# Patient Record
Sex: MALE | Race: WHITE | NOT HISPANIC OR LATINO
[De-identification: names, ages, dates, MRNs, and addresses within clinical notes are randomized per-mention and may not be internally consistent; named-entity substitution may affect disease eponyms.]

---

## 2017-01-13 ENCOUNTER — APPOINTMENT (OUTPATIENT)
Dept: ELECTROPHYSIOLOGY | Facility: CLINIC | Age: 62
End: 2017-01-13

## 2017-02-27 ENCOUNTER — APPOINTMENT (OUTPATIENT)
Dept: ELECTROPHYSIOLOGY | Facility: CLINIC | Age: 62
End: 2017-02-27

## 2017-02-27 VITALS — DIASTOLIC BLOOD PRESSURE: 82 MMHG | SYSTOLIC BLOOD PRESSURE: 124 MMHG | HEART RATE: 112 BPM | HEIGHT: 71 IN

## 2017-02-27 VITALS — OXYGEN SATURATION: 98 %

## 2017-02-27 DIAGNOSIS — Z78.9 OTHER SPECIFIED HEALTH STATUS: ICD-10-CM

## 2017-02-27 DIAGNOSIS — Z86.19 PERSONAL HISTORY OF OTHER INFECTIOUS AND PARASITIC DISEASES: ICD-10-CM

## 2017-02-27 DIAGNOSIS — Z83.79 FAMILY HISTORY OF OTHER DISEASES OF THE DIGESTIVE SYSTEM: ICD-10-CM

## 2017-03-02 PROBLEM — Z78.9 CURRENT NON-SMOKER: Status: ACTIVE | Noted: 2017-03-02

## 2017-03-02 RX ORDER — WARFARIN 10 MG/1
10 TABLET ORAL
Refills: 0 | Status: DISCONTINUED | COMMUNITY
End: 2017-03-02

## 2017-03-22 ENCOUNTER — TRANSCRIPTION ENCOUNTER (OUTPATIENT)
Age: 62
End: 2017-03-22

## 2017-03-22 ENCOUNTER — OUTPATIENT (OUTPATIENT)
Dept: OUTPATIENT SERVICES | Facility: HOSPITAL | Age: 62
LOS: 1 days | End: 2017-03-22
Payer: COMMERCIAL

## 2017-03-22 VITALS
WEIGHT: 184.97 LBS | DIASTOLIC BLOOD PRESSURE: 85 MMHG | RESPIRATION RATE: 18 BRPM | HEART RATE: 79 BPM | TEMPERATURE: 98 F | OXYGEN SATURATION: 95 % | SYSTOLIC BLOOD PRESSURE: 172 MMHG | HEIGHT: 71 IN

## 2017-03-22 VITALS
TEMPERATURE: 98 F | DIASTOLIC BLOOD PRESSURE: 107 MMHG | RESPIRATION RATE: 16 BRPM | HEART RATE: 76 BPM | SYSTOLIC BLOOD PRESSURE: 172 MMHG | OXYGEN SATURATION: 96 %

## 2017-03-22 DIAGNOSIS — Z98.890 OTHER SPECIFIED POSTPROCEDURAL STATES: Chronic | ICD-10-CM

## 2017-03-22 DIAGNOSIS — I48.1 PERSISTENT ATRIAL FIBRILLATION: ICD-10-CM

## 2017-03-22 LAB
ANION GAP SERPL CALC-SCNC: 11 MMOL/L — SIGNIFICANT CHANGE UP (ref 5–17)
ANISOCYTOSIS BLD QL: SLIGHT — SIGNIFICANT CHANGE UP
APTT BLD: 41.2 SEC — HIGH (ref 27.5–37.4)
BUN SERPL-MCNC: 19 MG/DL — SIGNIFICANT CHANGE UP (ref 8–20)
CALCIUM SERPL-MCNC: 8.9 MG/DL — SIGNIFICANT CHANGE UP (ref 8.6–10.2)
CHLORIDE SERPL-SCNC: 103 MMOL/L — SIGNIFICANT CHANGE UP (ref 98–107)
CO2 SERPL-SCNC: 25 MMOL/L — SIGNIFICANT CHANGE UP (ref 22–29)
CREAT SERPL-MCNC: 0.64 MG/DL — SIGNIFICANT CHANGE UP (ref 0.5–1.3)
EOSINOPHIL NFR BLD AUTO: 1 % — SIGNIFICANT CHANGE UP (ref 0–6)
GLUCOSE SERPL-MCNC: 105 MG/DL — SIGNIFICANT CHANGE UP (ref 70–115)
HCT VFR BLD CALC: 44.5 % — SIGNIFICANT CHANGE UP (ref 42–52)
HGB BLD-MCNC: 15.3 G/DL — SIGNIFICANT CHANGE UP (ref 14–18)
INR BLD: 1.63 RATIO — HIGH (ref 0.88–1.16)
LYMPHOCYTES # BLD AUTO: 30 % — SIGNIFICANT CHANGE UP (ref 20–55)
MACROCYTES BLD QL: SLIGHT — SIGNIFICANT CHANGE UP
MCHC RBC-ENTMCNC: 31.3 PG — HIGH (ref 27–31)
MCHC RBC-ENTMCNC: 34.4 G/DL — SIGNIFICANT CHANGE UP (ref 32–36)
MCV RBC AUTO: 91 FL — SIGNIFICANT CHANGE UP (ref 80–94)
MICROCYTES BLD QL: SLIGHT — SIGNIFICANT CHANGE UP
MONOCYTES NFR BLD AUTO: 7 % — SIGNIFICANT CHANGE UP (ref 3–10)
NEUTROPHILS NFR BLD AUTO: 57 % — SIGNIFICANT CHANGE UP (ref 37–73)
PLAT MORPH BLD: NORMAL — SIGNIFICANT CHANGE UP
PLATELET # BLD AUTO: 105 K/UL — LOW (ref 150–400)
POIKILOCYTOSIS BLD QL AUTO: SLIGHT — SIGNIFICANT CHANGE UP
POTASSIUM SERPL-MCNC: 5.2 MMOL/L — SIGNIFICANT CHANGE UP (ref 3.5–5.3)
POTASSIUM SERPL-SCNC: 5.2 MMOL/L — SIGNIFICANT CHANGE UP (ref 3.5–5.3)
PROTHROM AB SERPL-ACNC: 18.1 SEC — HIGH (ref 9.8–12.7)
RBC # BLD: 4.89 M/UL — SIGNIFICANT CHANGE UP (ref 4.6–6.2)
RBC # FLD: 13.7 % — SIGNIFICANT CHANGE UP (ref 11–15.6)
RBC BLD AUTO: ABNORMAL
SODIUM SERPL-SCNC: 139 MMOL/L — SIGNIFICANT CHANGE UP (ref 135–145)
VARIANT LYMPHS # BLD: 5 % — SIGNIFICANT CHANGE UP (ref 0–6)
WBC # BLD: 6.5 K/UL — SIGNIFICANT CHANGE UP (ref 4.8–10.8)
WBC # FLD AUTO: 6.5 K/UL — SIGNIFICANT CHANGE UP (ref 4.8–10.8)

## 2017-03-22 PROCEDURE — 80048 BASIC METABOLIC PNL TOTAL CA: CPT

## 2017-03-22 PROCEDURE — 93325 DOPPLER ECHO COLOR FLOW MAPG: CPT | Mod: 26

## 2017-03-22 PROCEDURE — 76376 3D RENDER W/INTRP POSTPROCES: CPT | Mod: 26

## 2017-03-22 PROCEDURE — 93312 ECHO TRANSESOPHAGEAL: CPT

## 2017-03-22 PROCEDURE — 85730 THROMBOPLASTIN TIME PARTIAL: CPT

## 2017-03-22 PROCEDURE — 85610 PROTHROMBIN TIME: CPT

## 2017-03-22 PROCEDURE — 93325 DOPPLER ECHO COLOR FLOW MAPG: CPT

## 2017-03-22 PROCEDURE — 93312 ECHO TRANSESOPHAGEAL: CPT | Mod: 26

## 2017-03-22 PROCEDURE — 93320 DOPPLER ECHO COMPLETE: CPT | Mod: 26

## 2017-03-22 PROCEDURE — 85027 COMPLETE CBC AUTOMATED: CPT

## 2017-03-22 PROCEDURE — 93010 ELECTROCARDIOGRAM REPORT: CPT

## 2017-03-22 PROCEDURE — 93005 ELECTROCARDIOGRAM TRACING: CPT

## 2017-03-22 PROCEDURE — 92960 CARDIOVERSION ELECTRIC EXT: CPT

## 2017-03-22 RX ORDER — HYDRALAZINE HCL 50 MG
10 TABLET ORAL ONCE
Qty: 0 | Refills: 0 | Status: COMPLETED | OUTPATIENT
Start: 2017-03-22 | End: 2017-03-22

## 2017-03-22 RX ADMIN — Medication 10 MILLIGRAM(S): at 11:59

## 2017-03-22 NOTE — DISCHARGE NOTE ADULT - MEDICATION SUMMARY - MEDICATIONS TO TAKE
I will START or STAY ON the medications listed below when I get home from the hospital:    amiodorone  -- 200 milligram(s) by mouth 2 times a day Then start 200mg 1 time daily  -- Indication: For Atrial fibrillation    lisinopril 20 mg oral tablet  -- 1 tab(s) by mouth once a day  -- Indication: For Hypertension    diltiazem 24 hour extended release  -- 180 milligram(s) by mouth once a day  -- Indication: For Hypertension    Xarelto 20 mg oral tablet  -- 1 tab(s) by mouth once a day (in the evening)  -- Indication: For Atrial fibrillation    Metoprolol Succinate  mg oral tablet, extended release  -- 1 tab(s) by mouth once a day  -- Indication: For Hypertension

## 2017-03-22 NOTE — DISCHARGE NOTE ADULT - HOSPITAL COURSE
Elective AWAIS/cardioversion successful.  AWAIS no clott 200 joules one attempt to NSR.  12 lead EKG NSR HR 60's.  Amiodarone load 200 mg twice a day for 2 weeks then decrease 200 mg once daily. Follow up with Dr Ruiz as outpatint in 2-3 weeks.

## 2017-03-22 NOTE — H&P PST ADULT - HISTORY OF PRESENT ILLNESS
62 yo male with h/o hypertension and paroxsymal afib for past 10 years with more then 15 prior cardioversions.  The last cardioversion 2016 lasted about 20 days.  Patient has a history of having sub-therapeutic INR on coumadin.  Patient is now on xarelto takes them at bedtime.  Patient states he has not missed a dose.  The last dose was taken last night.  Patient here today for AWIAS/Cardioversion.

## 2017-03-22 NOTE — DISCHARGE NOTE ADULT - CARE PROVIDER_API CALL
Aditay Ruiz (MD), Cardiac Electrophysiology; Cardiovascular Disease; Internal Medicine  [address] [place] [address]  Phone: [phone]  Fax: [phone]

## 2017-03-22 NOTE — H&P PST ADULT - PSH
History of cardioversion  X 15 times  S/P foot surgery, left    S/P hernia repair    S/P subdural hematoma evacuation  several surgeries

## 2017-03-22 NOTE — DISCHARGE NOTE ADULT - PATIENT PORTAL LINK FT
“You can access the FollowHealth Patient Portal, offered by [facility], by registering with the following website: [email or web address]”

## 2017-03-22 NOTE — DISCHARGE NOTE ADULT - CARE PLAN
Principal Discharge DX:	Atrial fibrillation  Goal:	ADL without SOB  Instructions for follow-up, activity and diet:	Follow up with Dr Ruiz as outpatient in 2-3 weeks

## 2017-03-28 ENCOUNTER — APPOINTMENT (OUTPATIENT)
Dept: UROLOGY | Facility: CLINIC | Age: 62
End: 2017-03-28

## 2017-03-28 VITALS
SYSTOLIC BLOOD PRESSURE: 164 MMHG | HEART RATE: 71 BPM | TEMPERATURE: 98.2 F | OXYGEN SATURATION: 96 % | WEIGHT: 185 LBS | BODY MASS INDEX: 25.9 KG/M2 | DIASTOLIC BLOOD PRESSURE: 98 MMHG | HEIGHT: 71 IN

## 2017-03-28 DIAGNOSIS — N42.89 OTHER SPECIFIED DISORDERS OF PROSTATE: ICD-10-CM

## 2017-03-28 RX ORDER — SILDENAFIL 20 MG/1
20 TABLET ORAL
Qty: 50 | Refills: 11 | Status: DISCONTINUED | COMMUNITY
Start: 2017-03-28 | End: 2017-03-28

## 2017-04-05 DIAGNOSIS — N52.9 MALE ERECTILE DYSFUNCTION, UNSPECIFIED: ICD-10-CM

## 2017-04-18 ENCOUNTER — APPOINTMENT (OUTPATIENT)
Dept: UROLOGY | Facility: CLINIC | Age: 62
End: 2017-04-18

## 2017-04-28 PROBLEM — N52.9 ERECTILE DYSFUNCTION: Status: ACTIVE | Noted: 2017-03-28

## 2017-04-28 LAB — PSA SERPL-MCNC: 0.17

## 2017-05-19 ENCOUNTER — OUTPATIENT (OUTPATIENT)
Dept: OUTPATIENT SERVICES | Facility: HOSPITAL | Age: 62
LOS: 1 days | End: 2017-05-19
Payer: COMMERCIAL

## 2017-05-19 VITALS — WEIGHT: 182.1 LBS | HEIGHT: 71 IN

## 2017-05-19 VITALS
HEIGHT: 71 IN | HEART RATE: 71 BPM | WEIGHT: 182.1 LBS | RESPIRATION RATE: 18 BRPM | SYSTOLIC BLOOD PRESSURE: 165 MMHG | TEMPERATURE: 98 F | OXYGEN SATURATION: 96 % | DIASTOLIC BLOOD PRESSURE: 99 MMHG

## 2017-05-19 DIAGNOSIS — Z98.890 OTHER SPECIFIED POSTPROCEDURAL STATES: Chronic | ICD-10-CM

## 2017-05-19 DIAGNOSIS — Z01.810 ENCOUNTER FOR PREPROCEDURAL CARDIOVASCULAR EXAMINATION: ICD-10-CM

## 2017-05-19 DIAGNOSIS — I48.1 PERSISTENT ATRIAL FIBRILLATION: ICD-10-CM

## 2017-05-19 LAB
ANION GAP SERPL CALC-SCNC: 13 MMOL/L — SIGNIFICANT CHANGE UP (ref 5–17)
APTT BLD: 41 SEC — HIGH (ref 27.5–37.4)
BASOPHILS # BLD AUTO: 0 K/UL — SIGNIFICANT CHANGE UP (ref 0–0.2)
BASOPHILS NFR BLD AUTO: 0.3 % — SIGNIFICANT CHANGE UP (ref 0–2)
BLD GP AB SCN SERPL QL: SIGNIFICANT CHANGE UP
BUN SERPL-MCNC: 15 MG/DL — SIGNIFICANT CHANGE UP (ref 8–20)
CALCIUM SERPL-MCNC: 8.8 MG/DL — SIGNIFICANT CHANGE UP (ref 8.6–10.2)
CHLORIDE SERPL-SCNC: 101 MMOL/L — SIGNIFICANT CHANGE UP (ref 98–107)
CO2 SERPL-SCNC: 25 MMOL/L — SIGNIFICANT CHANGE UP (ref 22–29)
CREAT SERPL-MCNC: 0.73 MG/DL — SIGNIFICANT CHANGE UP (ref 0.5–1.3)
EOSINOPHIL # BLD AUTO: 0.2 K/UL — SIGNIFICANT CHANGE UP (ref 0–0.5)
EOSINOPHIL NFR BLD AUTO: 3.2 % — SIGNIFICANT CHANGE UP (ref 0–6)
GLUCOSE SERPL-MCNC: 120 MG/DL — HIGH (ref 70–115)
HCT VFR BLD CALC: 43.8 % — SIGNIFICANT CHANGE UP (ref 42–52)
HGB BLD-MCNC: 14.9 G/DL — SIGNIFICANT CHANGE UP (ref 14–18)
INR BLD: 1.25 RATIO — HIGH (ref 0.88–1.16)
LYMPHOCYTES # BLD AUTO: 2.1 K/UL — SIGNIFICANT CHANGE UP (ref 1–4.8)
LYMPHOCYTES # BLD AUTO: 35.2 % — SIGNIFICANT CHANGE UP (ref 20–55)
MCHC RBC-ENTMCNC: 30.6 PG — SIGNIFICANT CHANGE UP (ref 27–31)
MCHC RBC-ENTMCNC: 34 G/DL — SIGNIFICANT CHANGE UP (ref 32–36)
MCV RBC AUTO: 89.9 FL — SIGNIFICANT CHANGE UP (ref 80–94)
MONOCYTES # BLD AUTO: 0.7 K/UL — SIGNIFICANT CHANGE UP (ref 0–0.8)
MONOCYTES NFR BLD AUTO: 11.1 % — HIGH (ref 3–10)
NEUTROPHILS # BLD AUTO: 3 K/UL — SIGNIFICANT CHANGE UP (ref 1.8–8)
NEUTROPHILS NFR BLD AUTO: 50 % — SIGNIFICANT CHANGE UP (ref 37–73)
PLATELET # BLD AUTO: 101 K/UL — LOW (ref 150–400)
POTASSIUM SERPL-MCNC: 3.6 MMOL/L — SIGNIFICANT CHANGE UP (ref 3.5–5.3)
POTASSIUM SERPL-SCNC: 3.6 MMOL/L — SIGNIFICANT CHANGE UP (ref 3.5–5.3)
PROTHROM AB SERPL-ACNC: 13.8 SEC — HIGH (ref 9.8–12.7)
RBC # BLD: 4.87 M/UL — SIGNIFICANT CHANGE UP (ref 4.6–6.2)
RBC # FLD: 13.4 % — SIGNIFICANT CHANGE UP (ref 11–15.6)
SODIUM SERPL-SCNC: 139 MMOL/L — SIGNIFICANT CHANGE UP (ref 135–145)
TYPE + AB SCN PNL BLD: SIGNIFICANT CHANGE UP
WBC # BLD: 6 K/UL — SIGNIFICANT CHANGE UP (ref 4.8–10.8)
WBC # FLD AUTO: 6 K/UL — SIGNIFICANT CHANGE UP (ref 4.8–10.8)

## 2017-05-19 PROCEDURE — 85730 THROMBOPLASTIN TIME PARTIAL: CPT

## 2017-05-19 PROCEDURE — 86850 RBC ANTIBODY SCREEN: CPT

## 2017-05-19 PROCEDURE — 93010 ELECTROCARDIOGRAM REPORT: CPT

## 2017-05-19 PROCEDURE — 75574 CT ANGIO HRT W/3D IMAGE: CPT

## 2017-05-19 PROCEDURE — 86901 BLOOD TYPING SEROLOGIC RH(D): CPT

## 2017-05-19 PROCEDURE — 75574 CT ANGIO HRT W/3D IMAGE: CPT | Mod: 26

## 2017-05-19 PROCEDURE — 93005 ELECTROCARDIOGRAM TRACING: CPT

## 2017-05-19 PROCEDURE — 85027 COMPLETE CBC AUTOMATED: CPT

## 2017-05-19 PROCEDURE — 85610 PROTHROMBIN TIME: CPT

## 2017-05-19 PROCEDURE — 86900 BLOOD TYPING SEROLOGIC ABO: CPT

## 2017-05-19 PROCEDURE — G0463: CPT

## 2017-05-19 PROCEDURE — 80048 BASIC METABOLIC PNL TOTAL CA: CPT

## 2017-05-19 RX ORDER — ENOXAPARIN SODIUM 100 MG/ML
80 INJECTION SUBCUTANEOUS ONCE
Qty: 0 | Refills: 0 | Status: DISCONTINUED | OUTPATIENT
Start: 2017-05-23 | End: 2017-06-03

## 2017-05-19 RX ORDER — ENOXAPARIN SODIUM 100 MG/ML
80 INJECTION SUBCUTANEOUS ONCE
Qty: 0 | Refills: 0 | Status: DISCONTINUED | OUTPATIENT
Start: 2017-05-19 | End: 2017-05-19

## 2017-05-19 NOTE — H&P PST ADULT - NEGATIVE CARDIOVASCULAR SYMPTOMS
no paroxysmal nocturnal dyspnea/no orthopnea/no dyspnea on exertion/no peripheral edema/no chest pain

## 2017-05-19 NOTE — H&P PST ADULT - PSH
History of cardioversion  X 15 times  S/P foot surgery, left    S/P hernia repair    S/P subdural hematoma evacuation  several surgeries Rt frontal scalp

## 2017-05-19 NOTE — H&P PST ADULT - NEGATIVE ENMT SYMPTOMS
no nasal congestion/no ear pain/no tinnitus/no nasal discharge/no hearing difficulty/no vertigo/no sinus symptoms

## 2017-05-19 NOTE — H&P PST ADULT - ASSESSMENT
60 y/o white male with persistent atrial fibrillation now PAF resistant to multiple CV and compliance with NOAC/BB/CCB to undergo ablation for atrial fib/flutter    Last dose Xarelto Mon May 22  No Xarelto Tuesday May 23 Pt demonstrated w/return to take Lovenox 80mg subq instead Tuesday May 23 dose given to pt  NPO after midnight Tuesday  No xarelto Wed May take other meds  Escort for discharge  CT Cardiac today

## 2017-05-19 NOTE — H&P PST ADULT - HISTORY OF PRESENT ILLNESS
60 y/o thin white male presents to PST to undergo atrial fibrillation PVI via cryoballoon ablation, flutter ablation and implant of Linq Loop Plastyc cardiac event recorder  He has h/o persistent/symptomatic atrial fibrillation that has not responded to multiple CV,

## 2017-05-22 PROBLEM — Z83.79 FAMILY HISTORY OF PANCREATITIS: Status: ACTIVE | Noted: 2017-03-28

## 2017-05-22 PROBLEM — Z86.19 HISTORY OF HEPATITIS C VIRUS INFECTION: Status: RESOLVED | Noted: 2017-03-28 | Resolved: 2017-05-22

## 2017-05-22 PROBLEM — Z78.9 CONSUMES ALCOHOL WEEKLY: Status: ACTIVE | Noted: 2017-03-28

## 2017-05-24 ENCOUNTER — TRANSCRIPTION ENCOUNTER (OUTPATIENT)
Age: 62
End: 2017-05-24

## 2017-05-24 ENCOUNTER — INPATIENT (INPATIENT)
Facility: HOSPITAL | Age: 62
LOS: 0 days | Discharge: HOME | DRG: 274 | End: 2017-05-25
Attending: INTERNAL MEDICINE | Admitting: INTERNAL MEDICINE
Payer: COMMERCIAL

## 2017-05-24 VITALS — HEART RATE: 60 BPM | TEMPERATURE: 98 F | SYSTOLIC BLOOD PRESSURE: 186 MMHG | DIASTOLIC BLOOD PRESSURE: 108 MMHG

## 2017-05-24 DIAGNOSIS — Z01.810 ENCOUNTER FOR PREPROCEDURAL CARDIOVASCULAR EXAMINATION: ICD-10-CM

## 2017-05-24 DIAGNOSIS — Z98.890 OTHER SPECIFIED POSTPROCEDURAL STATES: Chronic | ICD-10-CM

## 2017-05-24 DIAGNOSIS — I48.1 PERSISTENT ATRIAL FIBRILLATION: ICD-10-CM

## 2017-05-24 LAB
BLD GP AB SCN SERPL QL: SIGNIFICANT CHANGE UP
TYPE + AB SCN PNL BLD: SIGNIFICANT CHANGE UP

## 2017-05-24 PROCEDURE — 93010 ELECTROCARDIOGRAM REPORT: CPT

## 2017-05-24 RX ORDER — LISINOPRIL 2.5 MG/1
20 TABLET ORAL DAILY
Qty: 0 | Refills: 0 | Status: DISCONTINUED | OUTPATIENT
Start: 2017-05-24 | End: 2017-05-25

## 2017-05-24 RX ORDER — DILTIAZEM HCL 120 MG
180 CAPSULE, EXT RELEASE 24 HR ORAL DAILY
Qty: 0 | Refills: 0 | Status: DISCONTINUED | OUTPATIENT
Start: 2017-05-24 | End: 2017-05-25

## 2017-05-24 RX ORDER — COLCHICINE 0.6 MG
0.6 TABLET ORAL DAILY
Qty: 0 | Refills: 0 | Status: DISCONTINUED | OUTPATIENT
Start: 2017-05-24 | End: 2017-05-25

## 2017-05-24 RX ORDER — METOPROLOL TARTRATE 50 MG
100 TABLET ORAL DAILY
Qty: 0 | Refills: 0 | Status: DISCONTINUED | OUTPATIENT
Start: 2017-05-24 | End: 2017-05-25

## 2017-05-24 RX ORDER — RIVAROXABAN 15 MG-20MG
20 KIT ORAL
Qty: 0 | Refills: 0 | Status: DISCONTINUED | OUTPATIENT
Start: 2017-05-24 | End: 2017-05-25

## 2017-05-24 RX ORDER — HYDRALAZINE HCL 50 MG
10 TABLET ORAL ONCE
Qty: 0 | Refills: 0 | Status: COMPLETED | OUTPATIENT
Start: 2017-05-24 | End: 2017-05-24

## 2017-05-24 RX ORDER — FENTANYL CITRATE 50 UG/ML
25 INJECTION INTRAVENOUS
Qty: 0 | Refills: 0 | Status: DISCONTINUED | OUTPATIENT
Start: 2017-05-24 | End: 2017-05-25

## 2017-05-24 RX ORDER — RIVAROXABAN 15 MG-20MG
1 KIT ORAL
Qty: 0 | Refills: 0 | COMMUNITY

## 2017-05-24 RX ORDER — LISINOPRIL 2.5 MG/1
1 TABLET ORAL
Qty: 0 | Refills: 0 | COMMUNITY

## 2017-05-24 RX ORDER — ALPRAZOLAM 0.25 MG
0.25 TABLET ORAL EVERY 6 HOURS
Qty: 0 | Refills: 0 | Status: DISCONTINUED | OUTPATIENT
Start: 2017-05-24 | End: 2017-05-25

## 2017-05-24 RX ORDER — SODIUM CHLORIDE 9 MG/ML
1000 INJECTION INTRAMUSCULAR; INTRAVENOUS; SUBCUTANEOUS
Qty: 0 | Refills: 0 | Status: DISCONTINUED | OUTPATIENT
Start: 2017-05-24 | End: 2017-05-25

## 2017-05-24 RX ORDER — PANTOPRAZOLE SODIUM 20 MG/1
40 TABLET, DELAYED RELEASE ORAL
Qty: 0 | Refills: 0 | Status: DISCONTINUED | OUTPATIENT
Start: 2017-05-24 | End: 2017-05-25

## 2017-05-24 RX ORDER — BENZOCAINE AND MENTHOL 5; 1 G/100ML; G/100ML
1 LIQUID ORAL
Qty: 0 | Refills: 0 | Status: DISCONTINUED | OUTPATIENT
Start: 2017-05-24 | End: 2017-05-25

## 2017-05-24 RX ORDER — ACETAMINOPHEN 500 MG
650 TABLET ORAL EVERY 6 HOURS
Qty: 0 | Refills: 0 | Status: DISCONTINUED | OUTPATIENT
Start: 2017-05-24 | End: 2017-05-25

## 2017-05-24 RX ORDER — METOPROLOL TARTRATE 50 MG
1 TABLET ORAL
Qty: 0 | Refills: 0 | COMMUNITY

## 2017-05-24 RX ADMIN — FENTANYL CITRATE 25 MICROGRAM(S): 50 INJECTION INTRAVENOUS at 21:02

## 2017-05-24 RX ADMIN — FENTANYL CITRATE 25 MICROGRAM(S): 50 INJECTION INTRAVENOUS at 22:13

## 2017-05-24 RX ADMIN — Medication 0.6 MILLIGRAM(S): at 17:47

## 2017-05-24 RX ADMIN — RIVAROXABAN 20 MILLIGRAM(S): KIT at 16:43

## 2017-05-24 RX ADMIN — FENTANYL CITRATE 25 MICROGRAM(S): 50 INJECTION INTRAVENOUS at 12:00

## 2017-05-24 RX ADMIN — FENTANYL CITRATE 25 MICROGRAM(S): 50 INJECTION INTRAVENOUS at 22:08

## 2017-05-24 RX ADMIN — Medication 10 MILLIGRAM(S): at 21:50

## 2017-05-24 NOTE — DISCHARGE NOTE ADULT - HOSPITAL COURSE
62 y/o male h/o HTN and persistent atrial fibrillation diagnosed > 10 years ago s/p multiple (>15) cardioversions. He presented electively 5/24/17 for cryoablation of AF and loop recorder implant A/P: 62 yo male with pmhx og hep C, HTN, persistent AF with prior DCCV, questionable TICM on AWAIS prior to last DCCV EF 30-35% pre cardioversion, elective admission for cryo AF/PVI. Pt is POD#1 s/p cryo AF/PVI, RF CTI and left parasternal MDT loop recorder implant.     1. AF: s/p ablation, currently NSR   -dc radial line  -brittaney groin sutures removed, bedrest x 30 min if stable-ambulate, post ambulation groin protocol and then if stable dc home after electrolyte supplementation  -continue xarelto, metoprolol, protonix, colchicine  -groin care, restrictions, medication changes and outpt follow up reviewed with pt    2.Cardiomyopathy:  -?TICM, EF on pre DCCV in 3/2017 30-35%, repeat EF not available. Abnormal coronary CT scan as above. ECG without ischemic changes and pt is asymptomatic. Reviewed with Dr Ruiz, pt to follow up with Dr Palomino for outpt stress test in 2-4 weeks, sooner if needed. This was reviewed at length with pt who verbalizes understaning  -dc diltiazem  -continue metoprolol, lisinopril  -BP check 1 week with primary card-Dr Palomino, may need anti-htn adjustment     3. s/p loop  carelink reviewed  keep site dry x 24 hours    above DW pt, nurse, MICU team and Dr Ruiz, agrees  Pt stable for discharge after above

## 2017-05-24 NOTE — PROGRESS NOTE ADULT - SUBJECTIVE AND OBJECTIVE BOX
s/p Implantation of Linq loop and EPS with successful atrial fibrillation ablation/pulmonary vein isolation via cryoballon ablation via R&L femoral veins with suture closure  Tolerated procedure well   Seen post procedure by Dr. Ruiz  REVIEW OF SYSTEMS:  Denies SOB, CP, NV, HA, dizziness, palpitations, site pain  PHYSICAL EXAM: A&Ox3 NAD Skin warm and dry  NEURO: Speech intact +gag +swallow Tongue midline TAVARES  NECK: No JVD, trachea midline. Eupneic  HEART: RRR S1S2 no g/m Post procedure ECG NSR  CHEST: Mid sternal DSD over loop recorder site  PULMONARY:  CTA jozef  ABDOMEN: Soft nontender X4 +BS Vdg/eating  EXTREMITIES: Rt Radial site: Rt radial pulse + w/pulse ox on right index finger SaO2>95% RUE w/o neurovascular deficit. Capillary refill <3 sec RA line for HD monitoring with good pleth  R&L femoral sites: No bleed, hematoma, pain, ecchymosis or swelling .  Sutures noted jozef w/DSD. Jozef DP/PTs+

## 2017-05-24 NOTE — DISCHARGE NOTE ADULT - PLAN OF CARE
minimize arrhythmia burden; optimize cardiac health - Bruising at the groin, sometimes extending down the leg, and/or a small lump under the skin at the groin access site is normal and will resolve within 2 – 3 weeks.   - Occasional skipped beats or palpitations that last for a few beats are common and generally resolve within 1-2 months.   - You make walk and take stairs at a regular pace.   - Do not perform any exercise more strenuous than walking for 1 week.   - Do not strain or lift heavy objects for 1 week.  - You may shower the day after the procedure.  - Do not soak in water (such as tub baths, hot tubs, swimming, etc.) for 1 week.   - You may resume all other activities the day after the procedure.  Call your doctor if:   - you notice bleeding, redness, drainage, swelling, increased tenderness or a hot sensation around the catheter insertion site.   - your temperature is greater than 100 degrees F for more than 24 hours.  - your rapid heart rhythm returns.  - you have any questions or concerns regarding the procedure.  If significant bleeding and/or a large lump (the size of a golf ball or bigger) occurs:  - Lie flat and apply continuous direct pressure just above the puncture site for at least 10 minutes  - If the issue resolves, notify your physician immediately.    - If the bleeding cannot be controlled, please seek immediate medical attention.  If you experience increased difficulty breathing or chest pain, or if you faint or have dizzy spells, please seek immediate medical attention. Loop Recorder Incision Care:     - Do not touch the incision until it is completely healed.   - There is Dermabond (skin glue) on your incision, which will start to flake off on its own over the next 2-3 weeks. Do not pick at or peal off the Dermabond.   - Do not apply soaps, creams, lotions, ointments or powders to the incision until it is completely healed.  - You should call the doctor if you notice redness, drainage, swelling, increased tenderness, hot sensation around the  incision, bleeding or incision edges pulling apart.

## 2017-05-24 NOTE — DISCHARGE NOTE ADULT - CARE PROVIDER_API CALL
Aditya Ruiz (MD), Cardiac Electrophysiology; Cardiovascular Disease; Internal Medicine  [address] [place] [address]  Phone: [phone]  Fax: [phone] Aditya Ruiz (MD), Cardiac Electrophysiology; Cardiovascular Disease; Internal Medicine  [address] [place] [address]  Phone: [phone]  Fax: [phone]    Nikunj Palomino (MD), Cardiovascular Disease  [address] [place] [address]  Phone: [phone]  Fax: [phone]

## 2017-05-24 NOTE — PROGRESS NOTE ADULT - ASSESSMENT
A-s/p Implantation of Loop Linq cardiac event recorder  s/p EPS with atrial fibrillation/pulmonary vein isolation via cryoballoon ablation and caval tricuspid isthmus line of block (fluuter) ablation via R&L femoral veins  RRA for HD monitoring

## 2017-05-24 NOTE — DISCHARGE NOTE ADULT - CARE PROVIDERS DIRECT ADDRESSES
[email or web address]

## 2017-05-24 NOTE — DISCHARGE NOTE ADULT - PATIENT PORTAL LINK FT
“You can access the FollowHealth Patient Portal, offered by [facility], by registering with the following website: [email or web address]”

## 2017-05-24 NOTE — DISCHARGE NOTE ADULT - CARE PLAN
Principal Discharge DX:	Persistent atrial fibrillation  Goal:	minimize arrhythmia burden; optimize cardiac health  Instructions for follow-up, activity and diet:	- Bruising at the groin, sometimes extending down the leg, and/or a small lump under the skin at the groin access site is normal and will resolve within 2 – 3 weeks.   - Occasional skipped beats or palpitations that last for a few beats are common and generally resolve within 1-2 months.   - You make walk and take stairs at a regular pace.   - Do not perform any exercise more strenuous than walking for 1 week.   - Do not strain or lift heavy objects for 1 week.  - You may shower the day after the procedure.  - Do not soak in water (such as tub baths, hot tubs, swimming, etc.) for 1 week.   - You may resume all other activities the day after the procedure.  Call your doctor if:   - you notice bleeding, redness, drainage, swelling, increased tenderness or a hot sensation around the catheter insertion site.   - your temperature is greater than 100 degrees F for more than 24 hours.  - your rapid heart rhythm returns.  - you have any questions or concerns regarding the procedure.  If significant bleeding and/or a large lump (the size of a golf ball or bigger) occurs:  - Lie flat and apply continuous direct pressure just above the puncture site for at least 10 minutes  - If the issue resolves, notify your physician immediately.    - If the bleeding cannot be controlled, please seek immediate medical attention.  If you experience increased difficulty breathing or chest pain, or if you faint or have dizzy spells, please seek immediate medical attention.  Instructions for follow-up, activity and diet:	Loop Recorder Incision Care:     - Do not touch the incision until it is completely healed.   - There is Dermabond (skin glue) on your incision, which will start to flake off on its own over the next 2-3 weeks. Do not pick at or peal off the Dermabond.   - Do not apply soaps, creams, lotions, ointments or powders to the incision until it is completely healed.  - You should call the doctor if you notice redness, drainage, swelling, increased tenderness, hot sensation around the  incision, bleeding or incision edges pulling apart.

## 2017-05-24 NOTE — PROGRESS NOTE ADULT - PROBLEM SELECTOR PLAN 1
ICU  Report to ICU by EP PA  Xarelto 20 mg po now then daily w/evening meal  Sutures brittaney groins to be removed in morning  Colchincine 0.6 mg po x30 days  Protonix 40 mg po daily x 30 days  F/U Dr. Multani 2-3 weeks  Event recorder teaching done by representative

## 2017-05-25 VITALS
HEART RATE: 70 BPM | DIASTOLIC BLOOD PRESSURE: 81 MMHG | SYSTOLIC BLOOD PRESSURE: 143 MMHG | RESPIRATION RATE: 18 BRPM | OXYGEN SATURATION: 95 %

## 2017-05-25 LAB
ANION GAP SERPL CALC-SCNC: 14 MMOL/L — SIGNIFICANT CHANGE UP (ref 5–17)
BUN SERPL-MCNC: 12 MG/DL — SIGNIFICANT CHANGE UP (ref 8–20)
CALCIUM SERPL-MCNC: 8.3 MG/DL — LOW (ref 8.6–10.2)
CHLORIDE SERPL-SCNC: 100 MMOL/L — SIGNIFICANT CHANGE UP (ref 98–107)
CO2 SERPL-SCNC: 23 MMOL/L — SIGNIFICANT CHANGE UP (ref 22–29)
CREAT SERPL-MCNC: 0.55 MG/DL — SIGNIFICANT CHANGE UP (ref 0.5–1.3)
GLUCOSE SERPL-MCNC: 115 MG/DL — SIGNIFICANT CHANGE UP (ref 70–115)
HCT VFR BLD CALC: 41 % — LOW (ref 42–52)
HGB BLD-MCNC: 14.2 G/DL — SIGNIFICANT CHANGE UP (ref 14–18)
MAGNESIUM SERPL-MCNC: 1.9 MG/DL — SIGNIFICANT CHANGE UP (ref 1.6–2.6)
MCHC RBC-ENTMCNC: 31.2 PG — HIGH (ref 27–31)
MCHC RBC-ENTMCNC: 34.6 G/DL — SIGNIFICANT CHANGE UP (ref 32–36)
MCV RBC AUTO: 90.1 FL — SIGNIFICANT CHANGE UP (ref 80–94)
PLATELET # BLD AUTO: 100 K/UL — LOW (ref 150–400)
POTASSIUM SERPL-MCNC: 3.3 MMOL/L — LOW (ref 3.5–5.3)
POTASSIUM SERPL-SCNC: 3.3 MMOL/L — LOW (ref 3.5–5.3)
RBC # BLD: 4.55 M/UL — LOW (ref 4.6–6.2)
RBC # FLD: 13.5 % — SIGNIFICANT CHANGE UP (ref 11–15.6)
SODIUM SERPL-SCNC: 137 MMOL/L — SIGNIFICANT CHANGE UP (ref 135–145)
WBC # BLD: 7.7 K/UL — SIGNIFICANT CHANGE UP (ref 4.8–10.8)
WBC # FLD AUTO: 7.7 K/UL — SIGNIFICANT CHANGE UP (ref 4.8–10.8)

## 2017-05-25 PROCEDURE — 80048 BASIC METABOLIC PNL TOTAL CA: CPT

## 2017-05-25 PROCEDURE — C1894: CPT

## 2017-05-25 PROCEDURE — 93662 INTRACARDIAC ECG (ICE): CPT

## 2017-05-25 PROCEDURE — 93613 INTRACARDIAC EPHYS 3D MAPG: CPT

## 2017-05-25 PROCEDURE — 93005 ELECTROCARDIOGRAM TRACING: CPT

## 2017-05-25 PROCEDURE — 86901 BLOOD TYPING SEROLOGIC RH(D): CPT

## 2017-05-25 PROCEDURE — C1759: CPT

## 2017-05-25 PROCEDURE — C1733: CPT

## 2017-05-25 PROCEDURE — 83735 ASSAY OF MAGNESIUM: CPT

## 2017-05-25 PROCEDURE — 33282: CPT

## 2017-05-25 PROCEDURE — C1731: CPT

## 2017-05-25 PROCEDURE — 93656 COMPRE EP EVAL ABLTJ ATR FIB: CPT

## 2017-05-25 PROCEDURE — C1893: CPT

## 2017-05-25 PROCEDURE — C1730: CPT

## 2017-05-25 PROCEDURE — C1769: CPT

## 2017-05-25 PROCEDURE — 86900 BLOOD TYPING SEROLOGIC ABO: CPT

## 2017-05-25 PROCEDURE — 85027 COMPLETE CBC AUTOMATED: CPT

## 2017-05-25 PROCEDURE — C1766: CPT

## 2017-05-25 PROCEDURE — 86920 COMPATIBILITY TEST SPIN: CPT

## 2017-05-25 PROCEDURE — 86850 RBC ANTIBODY SCREEN: CPT

## 2017-05-25 PROCEDURE — C1764: CPT

## 2017-05-25 PROCEDURE — 93010 ELECTROCARDIOGRAM REPORT: CPT

## 2017-05-25 RX ORDER — PANTOPRAZOLE SODIUM 20 MG/1
1 TABLET, DELAYED RELEASE ORAL
Qty: 30 | Refills: 0 | OUTPATIENT
Start: 2017-05-25 | End: 2017-06-24

## 2017-05-25 RX ORDER — COLCHICINE 0.6 MG
1 TABLET ORAL
Qty: 30 | Refills: 0 | OUTPATIENT
Start: 2017-05-25 | End: 2017-06-24

## 2017-05-25 RX ORDER — MAGNESIUM SULFATE 500 MG/ML
2 VIAL (ML) INJECTION ONCE
Qty: 0 | Refills: 0 | Status: COMPLETED | OUTPATIENT
Start: 2017-05-25 | End: 2017-05-25

## 2017-05-25 RX ORDER — POTASSIUM CHLORIDE 20 MEQ
10 PACKET (EA) ORAL ONCE
Qty: 0 | Refills: 0 | Status: COMPLETED | OUTPATIENT
Start: 2017-05-25 | End: 2017-05-25

## 2017-05-25 RX ADMIN — Medication 180 MILLIGRAM(S): at 06:46

## 2017-05-25 RX ADMIN — Medication 25 MILLIGRAM(S): at 10:17

## 2017-05-25 RX ADMIN — Medication 100 MILLIEQUIVALENT(S): at 09:59

## 2017-05-25 RX ADMIN — Medication 50 GRAM(S): at 08:49

## 2017-05-25 RX ADMIN — Medication 0.6 MILLIGRAM(S): at 12:02

## 2017-05-25 RX ADMIN — PANTOPRAZOLE SODIUM 40 MILLIGRAM(S): 20 TABLET, DELAYED RELEASE ORAL at 06:48

## 2017-05-25 RX ADMIN — LISINOPRIL 20 MILLIGRAM(S): 2.5 TABLET ORAL at 06:46

## 2017-05-25 RX ADMIN — Medication 100 MILLIGRAM(S): at 06:45

## 2017-05-25 NOTE — PROGRESS NOTE ADULT - SUBJECTIVE AND OBJECTIVE BOX
Pt seen and examined in CICU.  No complaints, no overnight events      EKG:  TELE: NSR 60-90bpm    MEDICATIONS  (STANDING):  sodium chloride 0.9%. 1000milliLiter(s) IV Continuous <Continuous>  pantoprazole    Tablet 40milliGRAM(s) Oral before breakfast  colchicine 0.6milliGRAM(s) Oral daily  lisinopril 20milliGRAM(s) Oral daily  metoprolol succinate ER 100milliGRAM(s) Oral daily  rivaroxaban 20milliGRAM(s) Oral <User Schedule>    MEDICATIONS  (PRN):  acetaminophen   Tablet. 650milliGRAM(s) Oral every 6 hours PRN Mild Pain (1 - 3)  fentaNYL    Injectable 25MICROGram(s) IV Push every 30 minutes PRN Severe Pain (7 - 10)  promethazine Injectable 25milliGRAM(s) IntraMuscular every 6 hours PRN Nausea and/or Vomiting  ALPRAZolam 0.25milliGRAM(s) Oral every 6 hours PRN anxiety and/or insomnia  benzocaine 15 mG/menthol 3.6 mG Lozenge 1Lozenge Oral every 2 hours PRN Sore Throat  aluminum hydroxide/magnesium hydroxide/simethicone Suspension 30milliLiter(s) Oral every 4 hours PRN Dyspepsia  oxyCODONE  5 mG/acetaminophen 325 mG 1Tablet(s) Oral every 4 hours PRN Moderate Pain (4 - 6)  oxyCODONE  5 mG/acetaminophen 325 mG 2Tablet(s) Oral every 4 hours PRN Severe Pain (7 - 10)      Allergies  Benadryl (Other)  Intolerances      PAST MEDICAL & SURGICAL HISTORY:  ED (erectile dysfunction)  Hepatitis C  Hypertension  Atrial fibrillation  No pertinent past medical history  S/P subdural hematoma evacuation: several surgeries Rt frontal scalp  S/P hernia repair  S/P foot surgery, left  History of cardioversion: X 15 times      Vital Signs Last 24 Hrs  T(C): 37.2, Max: 37.2 (05-25 @ 07:00)  T(F): 98.9, Max: 98.9 (05-25 @ 07:00)  HR: 68 (65 - 96)  BP: 144/72 (143/81 - 175/94)  BP(mean): 100 (100 - 127)  RR: 12 (10 - 30)  SpO2: 95% (93% - 98%)    Physical Exam:  Constitutional: NAD, AAOx3  Cardiovascular: +S1S2 RRR  LACW parasternal loop site without bleeding, swelling, hemaotma  Pulmonary: CTA b/l, unlabored  GI: soft NTND +BS  Extremities: no pedal edema, +distal pulses b/l  bilateral groins: sutures removed, wnl without bleeding, swelling, hematoma +distal pulses bilaterally  Neuro: non focal, TAVARES x4  radial a line intact  LABS:                        14.2   7.7   )-----------( 100      ( 25 May 2017 04:07 )             41.0     05-25    137  |  100  |  12.0  ----------------------------<  115  3.3<L>   |  23.0  |  0.55    Ca    8.3<L>      25 May 2017 04:07  Mg     1.9     05-25      EXAM:  ECHO TRANSESOPHAGEAL    EXAM:  DOPPLER ECHO COMP W SPECTRAL    EXAM:  DOPPLER ECHOCARD COLOR FLOW     Summary:   1. Technically good study.   2. Moderately decreased global left ventricular systolic function.   3. Left ventricular ejection fraction, by visual estimation, is 30 to   35%.   4. The mitral in-flow pattern reveals no discernable A-wave, therefore   no comment on diastolic function can be made.   5. Color flow doppler and intravenous injection of agitated saline   demonstrates the presence of an intact intra atrial septum.   6. Moderately enlarged left atrium.   7. Normal right ventricular size and function.   8. Mild mitral valve regurgitation.   9. There is no evidence of pericardial effusion.   Santiago Haney MD Electronically signed on 3/22/2017 at 7:40:13 PM       EXAM:  CT ANGIO HEART W CORONARIES IC                        PROCEDURE DATE:  05/19/2017    IMPRESSION:   3 right-sided pulmonary veins and 2 left-sided pulmonary veins. No left   atrial appendage thrombus.  Calcified and noncalcified plaque causes probable mild to moderate LAD   stenosis. Otherwise, there is no stenosis or evidence of atherosclerosis   involving the major epicardial coronary arteries.      A/P: 60 yo male with pmhx og hep C, HTN, persistent AF with prior DCCV, questionable TICM on AWAIS prior to last DCCV EF 30-35% pre cardioversion, elective admission for cryo AF/PVI. Pt is POD#1 s/p cryo AF/PVI, RF CTI and left parasternal MDT loop recorder implant.    1. AF: s/p ablation, currently NSR   -dc radial line  -brittaney groin sutures removed, bedrest x 30 min if stable-ambulate, post ambulation groin protocol and then if stable dc home after electrolyte supplementation  -continue xarelto, metoprolol, protonix, colchicine  -groin care, restrictions, medication changes and outpt follow up reviewed with pt    2.Cardiomyopathy:  -?TICM, EF on pre DCCV in 3/2017 30-35%, repeat EF not available. Abnormal coronary CT scan as above. ECG without ischemic changes and pt is asymptomatic. Reviewed with Dr Rouse, pt to follow up with Dr Palomino for outpt stress test in 2-4 weeks, sooner if needed. This was reviewed at length with pt who verbalizes understaning  -dc diltiazem  -continue metoprolol, lisinopril  -BP check 1 week with primary card-Dr Palomino, may need anti-htn adjustment     3. s/p loop  carelink reviewed  keep site dry x 24 hours    above DW pt, nurse, MICU team and Dr Rouse, agrees

## 2017-06-12 PROBLEM — F52.0 LOW SEXUAL DESIRE DISORDER: Status: ACTIVE | Noted: 2017-06-12

## 2017-06-13 ENCOUNTER — APPOINTMENT (OUTPATIENT)
Dept: ELECTROPHYSIOLOGY | Facility: CLINIC | Age: 62
End: 2017-06-13

## 2017-06-13 VITALS
OXYGEN SATURATION: 97 % | WEIGHT: 180 LBS | HEIGHT: 71 IN | HEART RATE: 70 BPM | DIASTOLIC BLOOD PRESSURE: 78 MMHG | SYSTOLIC BLOOD PRESSURE: 128 MMHG | BODY MASS INDEX: 25.2 KG/M2

## 2017-06-13 DIAGNOSIS — N40.0 BENIGN PROSTATIC HYPERPLASIA WITHOUT LOWER URINARY TRACT SYMPMS: ICD-10-CM

## 2017-06-13 DIAGNOSIS — F52.0 HYPOACTIVE SEXUAL DESIRE DISORDER: ICD-10-CM

## 2017-06-13 DIAGNOSIS — R35.0 FREQUENCY OF MICTURITION: ICD-10-CM

## 2017-06-15 PROBLEM — N42.89 PROSTATE ASYMMETRY: Status: ACTIVE | Noted: 2017-06-15

## 2017-06-15 PROBLEM — R35.0 FREQUENCY OF URINATION: Status: ACTIVE | Noted: 2017-06-12

## 2017-06-15 PROBLEM — N40.0 BPH WITHOUT OBSTRUCTION/LOWER URINARY TRACT SYMPTOMS: Status: ACTIVE | Noted: 2017-06-12

## 2017-07-16 ENCOUNTER — TRANSCRIPTION ENCOUNTER (OUTPATIENT)
Age: 62
End: 2017-07-16

## 2017-09-04 ENCOUNTER — TRANSCRIPTION ENCOUNTER (OUTPATIENT)
Age: 62
End: 2017-09-04

## 2017-09-07 ENCOUNTER — TRANSCRIPTION ENCOUNTER (OUTPATIENT)
Age: 62
End: 2017-09-07

## 2018-01-10 ENCOUNTER — TRANSCRIPTION ENCOUNTER (OUTPATIENT)
Age: 63
End: 2018-01-10

## 2018-01-17 ENCOUNTER — APPOINTMENT (OUTPATIENT)
Dept: CARDIOLOGY | Facility: CLINIC | Age: 63
End: 2018-01-17

## 2018-01-24 ENCOUNTER — MEDICATION RENEWAL (OUTPATIENT)
Age: 63
End: 2018-01-24

## 2018-01-31 NOTE — DISCHARGE NOTE ADULT - MEDICATION SUMMARY - MEDICATIONS TO STOP TAKING
RESEARCH PROGRESS NOTE  Study Title: TRANSFORM   IRB #: [id number]  Brief Description: Aortic Valve  PI: Dr. Hardy Sidhu    Visit: 4 year follow-up    All protocol required activities completed.  Risks and benefits of ongoing participation in this research study were reviewed with subject. All questions answered. Subject verbalizes willingness to continue participation in research protocol and required follow-up visits. Subject has been reminded to contact  if there are any admissions to the ED, or hospitalizations prior to the next scheduled visit. Subject encouraged contacting study team with any questions or concerns.        Subject will return in one year for year visit    Coordinator contact: Mary Sylvester RN, CCRC [phone]       I will STOP taking the medications listed below when I get home from the hospital:    diltiazem 24 hour extended release  -- 180 milligram(s) by mouth once a day

## 2018-02-28 ENCOUNTER — MEDICATION RENEWAL (OUTPATIENT)
Age: 63
End: 2018-02-28

## 2018-02-28 ENCOUNTER — RX RENEWAL (OUTPATIENT)
Age: 63
End: 2018-02-28

## 2018-03-01 ENCOUNTER — RX RENEWAL (OUTPATIENT)
Age: 63
End: 2018-03-01

## 2018-04-19 ENCOUNTER — APPOINTMENT (OUTPATIENT)
Dept: CARDIOLOGY | Facility: CLINIC | Age: 63
End: 2018-04-19
Payer: COMMERCIAL

## 2018-04-19 VITALS
RESPIRATION RATE: 16 BRPM | SYSTOLIC BLOOD PRESSURE: 150 MMHG | WEIGHT: 190 LBS | DIASTOLIC BLOOD PRESSURE: 90 MMHG | HEART RATE: 88 BPM | BODY MASS INDEX: 26.6 KG/M2 | HEIGHT: 71 IN

## 2018-04-19 DIAGNOSIS — Z82.49 FAMILY HISTORY OF ISCHEMIC HEART DISEASE AND OTHER DISEASES OF THE CIRCULATORY SYSTEM: ICD-10-CM

## 2018-04-19 PROCEDURE — 93000 ELECTROCARDIOGRAM COMPLETE: CPT

## 2018-04-19 PROCEDURE — 99214 OFFICE O/P EST MOD 30 MIN: CPT

## 2018-04-19 RX ORDER — ASPIRIN 325 MG/1
325 TABLET, FILM COATED ORAL
Refills: 0 | Status: DISCONTINUED | COMMUNITY
End: 2018-04-19

## 2018-05-24 ENCOUNTER — APPOINTMENT (OUTPATIENT)
Dept: CARDIOLOGY | Facility: CLINIC | Age: 63
End: 2018-05-24
Payer: COMMERCIAL

## 2018-05-24 PROCEDURE — 93351 STRESS TTE COMPLETE: CPT

## 2018-06-19 ENCOUNTER — APPOINTMENT (OUTPATIENT)
Dept: CARDIOLOGY | Facility: CLINIC | Age: 63
End: 2018-06-19
Payer: COMMERCIAL

## 2018-06-19 PROCEDURE — 93298 REM INTERROG DEV EVAL SCRMS: CPT

## 2018-07-12 ENCOUNTER — TRANSCRIPTION ENCOUNTER (OUTPATIENT)
Age: 63
End: 2018-07-12

## 2018-07-19 ENCOUNTER — APPOINTMENT (OUTPATIENT)
Dept: CARDIOLOGY | Facility: CLINIC | Age: 63
End: 2018-07-19
Payer: COMMERCIAL

## 2018-07-19 PROBLEM — B19.20 UNSPECIFIED VIRAL HEPATITIS C WITHOUT HEPATIC COMA: Chronic | Status: ACTIVE | Noted: 2017-05-19

## 2018-07-19 PROBLEM — N52.9 MALE ERECTILE DYSFUNCTION, UNSPECIFIED: Chronic | Status: ACTIVE | Noted: 2017-05-19

## 2018-07-19 PROBLEM — I48.91 UNSPECIFIED ATRIAL FIBRILLATION: Chronic | Status: ACTIVE | Noted: 2017-03-22

## 2018-07-19 PROBLEM — I10 ESSENTIAL (PRIMARY) HYPERTENSION: Chronic | Status: ACTIVE | Noted: 2017-03-22

## 2018-07-19 PROCEDURE — 93298 REM INTERROG DEV EVAL SCRMS: CPT

## 2018-08-13 ENCOUNTER — APPOINTMENT (OUTPATIENT)
Dept: CARDIOLOGY | Facility: CLINIC | Age: 63
End: 2018-08-13
Payer: COMMERCIAL

## 2018-08-13 VITALS
RESPIRATION RATE: 16 BRPM | HEART RATE: 64 BPM | BODY MASS INDEX: 26.6 KG/M2 | SYSTOLIC BLOOD PRESSURE: 130 MMHG | WEIGHT: 190 LBS | DIASTOLIC BLOOD PRESSURE: 85 MMHG | HEIGHT: 71 IN

## 2018-08-13 PROCEDURE — 99214 OFFICE O/P EST MOD 30 MIN: CPT

## 2018-08-20 ENCOUNTER — APPOINTMENT (OUTPATIENT)
Dept: CARDIOLOGY | Facility: CLINIC | Age: 63
End: 2018-08-20
Payer: COMMERCIAL

## 2018-08-20 PROCEDURE — 93298 REM INTERROG DEV EVAL SCRMS: CPT

## 2018-09-18 ENCOUNTER — APPOINTMENT (OUTPATIENT)
Dept: CARDIOLOGY | Facility: CLINIC | Age: 63
End: 2018-09-18
Payer: SELF-PAY

## 2018-09-18 PROCEDURE — 93298 REM INTERROG DEV EVAL SCRMS: CPT

## 2018-12-18 NOTE — DISCHARGE NOTE ADULT - MEDICATION SUMMARY - MEDICATIONS TO TAKE
No adenopathy or splenomegaly. No cervical or inguinal lymphadenopathy. I will START or STAY ON the medications listed below when I get home from the hospital:    lisinopril 20 mg oral tablet  -- 1 tab(s) by mouth once a day  -- Indication: For htn    Xarelto 20 mg oral tablet  -- 1 tab(s) by mouth once a day (in the evening)  -- Indication: For ac    colchicine 0.6 mg oral tablet  -- 1 tab(s) by mouth once a day  -- Indication: For Post ablation antiinflammatory    Metoprolol Succinate  mg oral tablet, extended release  -- 1 tab(s) by mouth once a day  -- Indication: For htn    pantoprazole 40 mg oral delayed release tablet  -- 1 tab(s) by mouth once a day (before a meal)  -- Indication: For gi prophy

## 2018-12-20 ENCOUNTER — APPOINTMENT (OUTPATIENT)
Dept: CARDIOLOGY | Facility: CLINIC | Age: 63
End: 2018-12-20

## 2019-01-07 ENCOUNTER — MEDICATION RENEWAL (OUTPATIENT)
Age: 64
End: 2019-01-07

## 2019-05-08 ENCOUNTER — APPOINTMENT (OUTPATIENT)
Dept: CARDIOLOGY | Facility: CLINIC | Age: 64
End: 2019-05-08

## 2019-12-19 NOTE — DISCHARGE NOTE ADULT - INSTRUCTIONS
Patient:   Shaka Zuniga            MRN: [id number]            FIN: [id number]              Age:   78 years     Sex:  FEMALE     :  34   Associated Diagnoses:   None   Author:   AZUCENA ROLAND     Subjective   Pt seen, chart reviewed  Night uneventful  Doing well today - fair po intake, no nausea or epigastric pain     Objective   Intake and Output   I & O between:  18-DEC-2019 10:16 TO 19-DEC-2019 10:16  Med Dosing Weight:  63  kg   17-DEC-2019  24 Hour Intake:   490.00  ( 7.78 mL/kg )  24 Hour Output:   675.00           24 Hour Urine/Stool Output:   0.0  24 Hour Balance:   -185.00           24 Hour Urine Output:   675.00  ( 0.45 mL/kg/hr )                   Urine Count:  4.00         VS/Measurements     Vitals between:   18-DEC-2019 10:16:26   TO   19-DEC-2019 10:16:26                   LAST RESULT MINIMUM MAXIMUM  Temperature 36.7 36.5 36.9  Heart Rate 86 73 88  Respiratory Rate 16 16 17  NISBP           111 100 111  NIDBP           67 57 67  NIMBP           82 71 82  SpO2                    93 92 96    General:  Alert and oriented, frail. Eye:  Normal conjunctiva. HENT:  Oral mucosa is moist.    Neck:  No jugular venous distention. Respiratory:  Lungs are clear to auscultation, Respirations are non-labored. Cardiovascular:  Normal rate, Regular rhythm, No edema, loud P2. Gastrointestinal:  Soft, Non-tender, Normal bowel sounds. Genitourinary:  No costovertebral angle tenderness. Neurologic:  Alert, Oriented, No focal deficits.       Results Review   Labs between:  18-DEC-2019 10:16 to 19-DEC-2019 10:16  CBC:                 WBC  HgB  Hct  Plt  MCV  RDW   19-DEC-2019 4.5  (L) 10.0  (L) 31.9  258  83.1  (H) 16.2   DIFF:                 Seg  Neutroph//ABS  Lymph//ABS  Mono//ABS  EOS/ABS  91-[facility]-7345 NOT APPLICABLE  49 // 2.2 35 // 1.6 11 // 0.5 4 // 0.2  BMP:                 Na  Cl  BUN  Glu   19-DEC-2019 143  (H) 112  (H) 33  (H) 155                              K  CO2  Cr  Ca 4.1  24  (H) 2.91  8.8   POC GLU:                 Latest Result  Latest Date  Minimum  Min Date  Maximum  Max Date                             (H) 160  19-DEC-2019 (H) 160  19-DEC-2019 (H) 118  18-DEC-2019  COAG:                 INR  PT  PTT  Ddimer  Fibrinogen    19-DEC-2019 2.5  (H) 25.0                            General results   Interpretation:           Impression and Plan   1. CKD 4 with ANN MARIE - creat trending down, almost back to baseline  2. CTEPH - clinically stable, INR therapeutic  3. Anemia - stable  4.  GERD - improving on high dose PPI   - stable   - c/w CPM, encourage p.o. intake, increase activity   - PH to decide re resuming diuretic tx   - repeat labs in am  D/w RN and Dr Angelito Harmon Choose lean meats and poultry without skin and prepare them without added saturated and trans fat.  Eat fish at least twice a week. Recent research shows that eating oily fish containing omega-3 fatty acids (for example, salmon, trout and herring) may help lower your risk of death from coronary artery disease.  Select fat-free, 1 percent fat and low-fat dairy products.  Cut back on foods containing partially hydrogenated vegetable oils to reduce trans fat in your diet.   To lower cholesterol, reduce saturated fat to no more than 5 to 6 percent of total calories. For someone eating 2,000 calories a day, that’s about 13 grams of saturated fat.  Cut back on beverages and foods with added sugars.  Choose and prepare foods with little or no salt. To lower blood pressure, aim to eat no more than 2,400 milligrams of sodium per day. Reducing daily intake to 1,500 mg is desirable because it can lower blood pressure even further.  If you drink alcohol, drink in moderation. That means one drink per day if you’re a woman and two drinks  per day if you’re a man.  Follow the American Heart Association recommendations when you eat out, and keep an eye on your portion sizes.

## 2020-07-15 NOTE — ASU PATIENT PROFILE, ADULT - PRO MENTAL HEALTH SX RECENT
CARDIOLOGY [phone]  PODIATRY [phone]    Please call Radiology to schedule your Duplex leg [phone]      
none

## 2020-10-07 ENCOUNTER — TRANSCRIPTION ENCOUNTER (OUTPATIENT)
Age: 65
End: 2020-10-07

## 2020-10-14 ENCOUNTER — RX RENEWAL (OUTPATIENT)
Age: 65
End: 2020-10-14

## 2020-10-19 ENCOUNTER — APPOINTMENT (OUTPATIENT)
Dept: CARDIOLOGY | Facility: CLINIC | Age: 65
End: 2020-10-19
Payer: MEDICARE

## 2020-10-19 ENCOUNTER — NON-APPOINTMENT (OUTPATIENT)
Age: 65
End: 2020-10-19

## 2020-10-19 VITALS
OXYGEN SATURATION: 98 % | TEMPERATURE: 98.5 F | HEART RATE: 72 BPM | SYSTOLIC BLOOD PRESSURE: 118 MMHG | DIASTOLIC BLOOD PRESSURE: 62 MMHG | HEIGHT: 71 IN | BODY MASS INDEX: 26.6 KG/M2 | WEIGHT: 190 LBS | RESPIRATION RATE: 16 BRPM

## 2020-10-19 PROCEDURE — 99214 OFFICE O/P EST MOD 30 MIN: CPT

## 2020-10-19 PROCEDURE — 93000 ELECTROCARDIOGRAM COMPLETE: CPT

## 2020-10-19 PROCEDURE — 99072 ADDL SUPL MATRL&STAF TM PHE: CPT

## 2020-10-19 RX ORDER — PREDNISONE 20 MG/1
20 TABLET ORAL
Qty: 6 | Refills: 0 | Status: DISCONTINUED | COMMUNITY
Start: 2020-10-07 | End: 2020-10-19

## 2020-10-19 RX ORDER — AMLODIPINE BESYLATE 5 MG/1
5 TABLET ORAL
Qty: 90 | Refills: 0 | Status: DISCONTINUED | COMMUNITY
Start: 2020-07-20 | End: 2020-10-19

## 2020-10-19 NOTE — HISTORY OF PRESENT ILLNESS
[FreeTextEntry1] : The patient of paroxysmal atrial fibrillation which was eventually treated with a cryoablation in May of 2017. Dr Ruiz\par \par He does also have a loop recorder which was implanted in May of 2017  which has not been transmitted in quit some time.  He would like it to be extracted\par \par He's not having any exertional shortness of breath, palpitations, PND, orthopnea or edema.

## 2020-10-19 NOTE — REASON FOR VISIT
[FreeTextEntry1] : Patient presents after a 2 year lapse in care.\par Home blood pressures running 140s/90s\par Monitors and takes antihypertensives in the morning 30 minutes apart from one another. \par \par In the middle of the day he states it still runs high. \par In July he spoke to RN and mentioned that he was still taking Amlodipine 5 mg mg QD although this was increased to 10 mg in 2018. We instructed him to continue 10 mg. \par \par Blood collected through his PCP work will be discussed.\par Recently worked up by [facility] for hematuria\par \par Mention some mild  b/l ankle swelling not associated with any SOB while on Amlodipine 10 mg. \par \par Physically active but does not exercise.\par No clinical recurrence of his atrial fibrillation.\par

## 2020-10-19 NOTE — ASSESSMENT
[FreeTextEntry1] : ECG normal sinus rhythm at 72 beats per minute. Left anterior fascicular block. Non specific T wave abnormality.\par \par \par Lab  data \par      10/12/20  4/11/18\par Chol. 218        199\par HDL   46           40\par LDL  155        127\par Tri. 145\par \par HGB 15.2\par Creat.0.61\par \par Exercise echocardiogram 5/24/18:\par Patient exercised for 7 minutes 35 seconds (8 Mets). \par No significant symptoms.\par Peak heart rate 164 (121%).\par Blood pressure response hypertensive\par ECG shows no ischemic changes but frequent APCs.\par Baseline echocardiogram normal with appropriate augmentation with exercise. The test is considered negative for exercise-induced ischemia or exercise-induced atrial fibrillation\par \par Impression\par 1. HX of  intrascapular discomfort has some typical and atypical features which has not reoccurred.\par    Stress test showed no evidence of exercise-induced ischemia and discomfort has resolved\par \par 2. 3/ 2017 cryoablation for atrial fibrillation with no clinical recurrence.  Last data obtained from the loop recorder    shows no evidence of recurrent atrial fibrillation. Pt would like  ILR explanted.\par \par 3. BPs at home 140s/90s early AM  with out any medications in his system . Today in office controlled. \par \par 4. Baseline abnormal EKG not necessarily change.\par \par 5.  Amlodipine 10 mg QD with some mild ankle edema. Denies any SOB could be CCB vs heat causing. No hx of HF.\par \par Plan:\par 1. Echo and follow up in 6 months \par 2. Strong encouraged him to watch diet and exercise.  If not improvement in his lipids will need to consider a statin. \par 3. Proceed with extraction of the loop recorder. [place] the [place] .[place].\par 4. Instructed the patient to monitor his blood pressure 3 hours after antihypertensives are taken in the AM and call in 2 weeks with an average.\par 5. Monitor edema, call back if worsens.\par 6. Repeat BW due in 3 months

## 2020-10-21 ENCOUNTER — RX RENEWAL (OUTPATIENT)
Age: 65
End: 2020-10-21

## 2020-10-26 ENCOUNTER — APPOINTMENT (OUTPATIENT)
Dept: ELECTROPHYSIOLOGY | Facility: CLINIC | Age: 65
End: 2020-10-26
Payer: MEDICARE

## 2020-10-26 VITALS
WEIGHT: 189 LBS | OXYGEN SATURATION: 98 % | BODY MASS INDEX: 26.46 KG/M2 | HEART RATE: 59 BPM | TEMPERATURE: 97.3 F | HEIGHT: 71 IN | SYSTOLIC BLOOD PRESSURE: 122 MMHG | DIASTOLIC BLOOD PRESSURE: 82 MMHG

## 2020-10-26 PROCEDURE — 99203 OFFICE O/P NEW LOW 30 MIN: CPT

## 2020-10-26 PROCEDURE — 99072 ADDL SUPL MATRL&STAF TM PHE: CPT

## 2020-10-26 PROCEDURE — 93000 ELECTROCARDIOGRAM COMPLETE: CPT

## 2020-10-29 NOTE — PHYSICAL EXAM
[General Appearance - Well Developed] : well developed [General Appearance - In No Acute Distress] : no acute distress [Normal Jugular Venous V Waves Present] : normal jugular venous V waves present [Heart Rate And Rhythm] : heart rate and rhythm were normal [Heart Sounds] : normal S1 and S2 [Murmurs] : no murmurs present [Arterial Pulses Normal] : the arterial pulses were normal [Edema] : no peripheral edema present [Auscultation Breath Sounds / Voice Sounds] : lungs were clear to auscultation bilaterally [Abdomen Tenderness] : non-tender [] : no rash [Impaired Insight] : insight and judgment were intact

## 2020-10-29 NOTE — REVIEW OF SYSTEMS
[Feeling Fatigued] : not feeling fatigued [Blurry Vision] : no blurred vision [Palpitations] : no palpitations [Cough] : no cough [Abdominal Pain] : no abdominal pain [Easy Bleeding] : no tendency for easy bleeding [Easy Bruising] : no tendency for easy bruising [Negative] : Cardiovascular

## 2021-02-09 ENCOUNTER — TRANSCRIPTION ENCOUNTER (OUTPATIENT)
Age: 66
End: 2021-02-09

## 2021-02-09 ENCOUNTER — NON-APPOINTMENT (OUTPATIENT)
Age: 66
End: 2021-02-09

## 2021-02-10 ENCOUNTER — APPOINTMENT (OUTPATIENT)
Dept: CARDIOLOGY | Facility: CLINIC | Age: 66
End: 2021-02-10
Payer: MEDICARE

## 2021-02-10 VITALS
BODY MASS INDEX: 27.3 KG/M2 | HEART RATE: 88 BPM | OXYGEN SATURATION: 98 % | SYSTOLIC BLOOD PRESSURE: 120 MMHG | RESPIRATION RATE: 16 BRPM | WEIGHT: 195 LBS | DIASTOLIC BLOOD PRESSURE: 70 MMHG | HEIGHT: 71 IN | TEMPERATURE: 97.3 F

## 2021-02-10 PROCEDURE — 99214 OFFICE O/P EST MOD 30 MIN: CPT

## 2021-02-10 PROCEDURE — 99072 ADDL SUPL MATRL&STAF TM PHE: CPT

## 2021-02-10 PROCEDURE — 93000 ELECTROCARDIOGRAM COMPLETE: CPT

## 2021-02-10 NOTE — REASON FOR VISIT
[FreeTextEntry1] : 64 y/o male presenting for cardic re-eval. On 2/9/21 he called the office stating he felt he was in AFIB. This started  on the evening of 2/8/21 and took 10 mg of Eliquis w/o consulting our practice. Denies any recent increase in ETOH, caffeine or personal stressors.  He lives in [facility] and had an ECG done at a [facility] in [facility]. Discussed with PA  BP stable ECG reviewed and confirmed  AFIB with a rate of 81 bpm.\par \par Last episode was in 2020 where he was treated at [facility]. Denies cardioversion believes he was treated pharmacologically. \par \par Pt was instructed to start Eliquis 5 mg BID. \par \par Home BPs running 130s-140s systolic. Metoprolol and Amlodipine are taken in the AM.  BPs are checking in the evening. States he recently purchased a new machine. \par \par Currently no SOB , chest pain, palps or edema. \par \par \par

## 2021-02-10 NOTE — PHYSICAL EXAM
[FreeTextEntry1] :                    Well appearing and nourished with no obvious deformities or distress.\par \par Eyes: \par No conjunctival injection and no xanthelasmas.\par HEENT: \par Normocephalic.Normal oral mucosa. No pallor or cyanosis\par Neck: \par No jugular venous distension. with normal A and V wave forms. No palpable adenopathy.\par Cardiovascular: \par Normal rate with irregular S1, S2 and a grade 1/6 systolic murmur. Distal arterial pulses are normal. No significant peripheral edema.\par Pulmonary: \par Lungs are clear to auscultation and percussion. Normal respiratory pattern without any accessory muscle use\par Abdomen: \par Soft, non-tender ; no palpable organomegaly or masses.\par Extremities:\par No digital clubbing, cyanosis or ischemic changes.\par Skin: \par No skin lesions, rashes, ulcers or xanthomas.\par Psychiatric: \par Alert and oriented to person, place and time. Appropriate mood and affect.

## 2021-02-10 NOTE — REVIEW OF SYSTEMS
[see HPI] : see HPI [Negative] : Endocrine [Recent Weight Gain (___ Lbs)] : recent [unfilled] ~Ulb weight gain

## 2021-02-10 NOTE — ASSESSMENT
[FreeTextEntry1] : ECG- AFIB 88 bpm , occ. PVC no significant ST or T wave changes\par \par Lab  data \par      10/12/20  4/11/18\par Chol. 218        199\par HDL   46           40\par LDL  155        127\par Tri. 145\par \par HGB 15.2\par Creat.0.61\par \par Exercise echocardiogram 5/24/18:\par Patient exercised for 7 minutes 35 seconds (8 Mets). \par No significant symptoms.\par Peak heart rate 164 (121%).\par Blood pressure response hypertensive\par ECG shows no ischemic changes but frequent APCs.\par Baseline echocardiogram normal with appropriate augmentation with exercise. The test is considered negative for exercise-induced ischemia or exercise-induced atrial fibrillation\par \par Impression\par 1.  3/ 2017 cryoablation for atrial fibrillation , as mof yesterday he is in rate controlled AFIB. By his report last episode was a year ago in 2020 and responded to meds given at [facility].  Last data obtained from his IRL  shows no evidence of recurrent atrial fibrillation .Has not followed through with explantation\par \par 2. .HX of  intrascapular discomfort has some typical and atypical features which has not reoccurred.\par    Stress test showed no evidence of exercise-induced ischemia and discomfort has resolved\par \par 3.BP today better then what he runs at home  (130s-140s)\par \par 4. Baseline abnormal EKG not necessarily change.\par \par 5.  Amlodipine 10 mg QD and no current edema. \par \par 6. No anginal exertion. \par \par 7. Up 6 lbs since October and aware. \par \par Plan:\par 1.Echo and OV to be scheduled on the same day for convenience\par 2. Strong encouraged him to watch diet and exercise.  May need to consider statin therapy if lipids don’t come    down with diet alone.  \par 3.  Will start on Multaq 400 mg BID as antiarrhythmic . Will check QTC during next OV.\par 4. Repeat BW due soon. \par 5. C/W Eliquis 5 mg BID for AC tx.\par \par \par Clinical follow up in 1-2 weeks

## 2021-02-10 NOTE — HISTORY OF PRESENT ILLNESS
[FreeTextEntry1] : \par HX\par The patient of paroxysmal atrial fibrillation which was eventually treated with a cryoablation in May of 2017. Dr Ruiz\par \par He does also have a loop recorder which was implanted in May of 2017  which has not been transmitted in quit some time. Has reached EOS and has not  had it explanted yet. \par \par He's not having any exertional shortness of breath, palpitations, PND, orthopnea or edema.

## 2021-02-11 RX ORDER — DRONEDARONE 400 MG/1
400 TABLET, FILM COATED ORAL TWICE DAILY
Qty: 180 | Refills: 2 | Status: DISCONTINUED | COMMUNITY
Start: 2021-02-10 | End: 2021-02-11

## 2021-02-22 ENCOUNTER — APPOINTMENT (OUTPATIENT)
Dept: CARDIOLOGY | Facility: CLINIC | Age: 66
End: 2021-02-22
Payer: MEDICARE

## 2021-02-22 VITALS
RESPIRATION RATE: 16 BRPM | SYSTOLIC BLOOD PRESSURE: 125 MMHG | DIASTOLIC BLOOD PRESSURE: 85 MMHG | WEIGHT: 198 LBS | TEMPERATURE: 97.4 F | HEIGHT: 71 IN | HEART RATE: 81 BPM | BODY MASS INDEX: 27.72 KG/M2 | OXYGEN SATURATION: 98 %

## 2021-02-22 PROCEDURE — 99072 ADDL SUPL MATRL&STAF TM PHE: CPT

## 2021-02-22 PROCEDURE — 93000 ELECTROCARDIOGRAM COMPLETE: CPT

## 2021-02-22 PROCEDURE — 93306 TTE W/DOPPLER COMPLETE: CPT

## 2021-02-22 PROCEDURE — 99214 OFFICE O/P EST MOD 30 MIN: CPT

## 2021-02-22 NOTE — REASON FOR VISIT
[FreeTextEntry1] : 64 y/o male presenting for cardic re-eval. On 2/9/21 he called the office stating he felt he was in AFIB and confirmed by electrocardiography at an urgent care.\par \par He was seen on 2/10/21  rx'd Multaq 400 mg but had a high copay.  Sotalol 80 mg BID is  the preferred and this was started on 2/11/2021.\par   AC therapy is eliquis 5 mg BID. \par \par Since converting back into AFIB he has felt SOB but no chest pain or palps. \par \par Repeat ECG to reassess QTC  and results of his echo will be reviewed\par \par Avoiding  ETOH, caffeine no personal stressors.  \par \par Last episode was in 2020 where he was treated at [facility]. Denies cardioversion believes he was treated pharmacologically. \par  \par \par \par \par \par

## 2021-02-22 NOTE — ASSESSMENT
[FreeTextEntry1] : ECG- AFIB 81bpm no significant ST or T wave changes. \par \par Echo 2/22/21\par EF 60-65%\par Moderate-Sev mitral regurgitation \par Moderate -Sev. tricuspid regurgitation \par \par Lab  data \par      10/12/20  4/11/18\par Chol. 218        199\par HDL   46           40\par LDL  155        127\par Tri. 145\par \par HGB 15.2\par Creat.0.61\par \par Exercise echocardiogram 5/24/18:\par Patient exercised for 7 minutes 35 seconds (8 Mets). \par No significant symptoms.\par Peak heart rate 164 (121%).\par Blood pressure response hypertensive\par ECG shows no ischemic changes but frequent APCs.\par Baseline echocardiogram normal with appropriate augmentation with exercise. The test is considered negative for exercise-induced ischemia or exercise-induced atrial fibrillation\par \par Impression\par 1.  3/ 2017 cryoablation for atrial fibrillation By his report,  last episode was a year ago in 2020 and responded to meds given at [facility].  Last data obtained from his IRL  shows no evidence of recurrent atrial fibrillation .Has not followed through with explantation\par \par -Now in persistent AF since 2/9/21 with good rate control and tolerating Sotalol 80 mg BID. QTC is WNL \par -Eliquis 5 mg BID\par - SOB w/o exertion since going back into afib.  \par \par 2. .HX of  intrascapular discomfort has some typical and atypical features which has not reoccurred.\par    Stress test showed no evidence of exercise-induced ischemia and discomfort has resolved\par \par 3.BP today fine. \par \par 4. Echo with NL LV fxn but new mod-sev. TR and MR which is possibly related to his AFIB.\par \par 5. Amlodipine 10 mg QD and no current edema. \par \par 6. No anginal exertion. \par \par \par \par Plan:\par 1. Although ventricular rate is controlled due to valvular disease and symptom Mr. Llanos would benefit from a AWAIS/cardioversion, he agrees. This will be arranged through our office. \par Reviewed with him the risk and benefit of the procedure including the potential for it to be unsuccessful.\par The AWAIS would also allow us to look more carefully at the valvular disease which is a new finding.\par \par 2. Strong encouraged him to watch diet and exercise.  May need to consider statin therapy if lipids don’t come    down with diet alone.  \par \par 3. Continue Sotalol and AC therapy. \par \par 4. Repeat BW due soon. \par \par \par Clinical follow up  after procedure is done.

## 2021-02-22 NOTE — HISTORY OF PRESENT ILLNESS
[FreeTextEntry1] : \par HX\par The patient of paroxysmal atrial fibrillation which was eventually treated with a cryoablation in May of 2017. Dr Ruiz\par \par He does also have a loop recorder which was implanted in May of 2017  which has not been transmitted in quit some time. Has reached EOS and has not  had it explanted yet. \par \par

## 2021-03-02 ENCOUNTER — RX RENEWAL (OUTPATIENT)
Age: 66
End: 2021-03-02

## 2021-03-04 ENCOUNTER — APPOINTMENT (OUTPATIENT)
Dept: CARDIOLOGY | Facility: CLINIC | Age: 66
End: 2021-03-04
Payer: MEDICARE

## 2021-03-04 VITALS
BODY MASS INDEX: 28 KG/M2 | WEIGHT: 200 LBS | HEART RATE: 97 BPM | TEMPERATURE: 97.7 F | HEIGHT: 71 IN | DIASTOLIC BLOOD PRESSURE: 82 MMHG | SYSTOLIC BLOOD PRESSURE: 128 MMHG | OXYGEN SATURATION: 97 %

## 2021-03-04 DIAGNOSIS — Z00.00 ENCOUNTER FOR GENERAL ADULT MEDICAL EXAMINATION W/OUT ABNORMAL FINDINGS: ICD-10-CM

## 2021-03-04 PROCEDURE — 99215 OFFICE O/P EST HI 40 MIN: CPT

## 2021-03-04 PROCEDURE — 99072 ADDL SUPL MATRL&STAF TM PHE: CPT

## 2021-03-04 NOTE — HISTORY OF PRESENT ILLNESS
[FreeTextEntry1] : he has no chest pain\par He has  shortness of breath\par He has no palpitations\par He has no syncope\par He is neurologically intact\par He has no edema\par He has no GI symptoms\par \par \par

## 2021-03-04 NOTE — REASON FOR VISIT
[Follow-Up - Clinic] : a clinic follow-up of [Atrial Fibrillation] : atrial fibrillation [FreeTextEntry1] : I saw this 64 y/o male in f/u on 03/04/21.\par He presents today because of his new onset atrial fibrillation, recurrent, and wishes to have a cardioversion, and subsequently a repeat ablation procedure.  \par On 2/9/21 he felt he was in AFIB and confirmed by electrocardiography at an urgent care.\par \par He was seen on 2/10/21  rx'd Multaq 400 mg but had a high copay.  Sotalol 80 mg BID is  the preferred and this was started on 2/11/2021.\par   AC therapy is eliquis 5 mg BID. \par \par Since converting back into AFIB he has felt SOB but no chest pain or palps. \par \par Repeat ECG to reassess QTC  and results of his echo will be reviewed\par \par Avoiding  ETOH, caffeine no personal stressors.  \par \par Last episode was in 2020 where he was treated at [facility]. Denies cardioversion believes he was treated pharmacologically. \par  \par \par \par \par \par

## 2021-03-04 NOTE — PHYSICAL EXAM
[General Appearance - Well Developed] : well developed [Normal Appearance] : normal appearance [Well Groomed] : well groomed [General Appearance - Well Nourished] : well nourished [No Deformities] : no deformities [General Appearance - In No Acute Distress] : no acute distress [Normal Conjunctiva] : the conjunctiva exhibited no abnormalities [Eyelids - No Xanthelasma] : the eyelids demonstrated no xanthelasmas [Normal Oral Mucosa] : normal oral mucosa [No Oral Pallor] : no oral pallor [No Oral Cyanosis] : no oral cyanosis [Normal Jugular Venous A Waves Present] : normal jugular venous A waves present [Normal Jugular Venous V Waves Present] : normal jugular venous V waves present [No Jugular Venous Lee A Waves] : no jugular venous lee A waves [Respiration, Rhythm And Depth] : normal respiratory rhythm and effort [Exaggerated Use Of Accessory Muscles For Inspiration] : no accessory muscle use [Auscultation Breath Sounds / Voice Sounds] : lungs were clear to auscultation bilaterally [Heart Sounds] : normal S1 and S2 [Murmurs] : no murmurs present [Irregularly Irregular] : the rhythm was irregularly irregular [Abdomen Soft] : soft [Abdomen Tenderness] : non-tender [Abdomen Mass (___ Cm)] : no abdominal mass palpated [Abnormal Walk] : normal gait [Gait - Sufficient For Exercise Testing] : the gait was sufficient for exercise testing [Nail Clubbing] : no clubbing of the fingernails [Cyanosis, Localized] : no localized cyanosis [Petechial Hemorrhages (___cm)] : no petechial hemorrhages [Skin Color & Pigmentation] : normal skin color and pigmentation [] : no rash [No Venous Stasis] : no venous stasis [Skin Lesions] : no skin lesions [No Skin Ulcers] : no skin ulcer [No Xanthoma] : no  xanthoma was observed [Oriented To Time, Place, And Person] : oriented to person, place, and time [Affect] : the affect was normal [Mood] : the mood was normal [No Anxiety] : not feeling anxious [FreeTextEntry1] :                    Well appearing and nourished with no obvious deformities or distress.\par \par Eyes: \par No conjunctival injection and no xanthelasmas.\par HEENT: \par Normocephalic.Normal oral mucosa. No pallor or cyanosis\par Neck: \par No jugular venous distension. with normal A and V wave forms. No palpable adenopathy.\par Cardiovascular: \par Normal rate with irregular S1, S2 and a grade 1/6 systolic murmur. Distal arterial pulses are normal. No significant peripheral edema.\par Pulmonary: \par Lungs are clear to auscultation and percussion. Normal respiratory pattern without any accessory muscle use\par Abdomen: \par Soft, non-tender ; no palpable organomegaly or masses.\par Extremities:\par No digital clubbing, cyanosis or ischemic changes.\par Skin: \par No skin lesions, rashes, ulcers or xanthomas.\par Psychiatric: \par Alert and oriented to person, place and time. Appropriate mood and affect.

## 2021-03-08 ENCOUNTER — APPOINTMENT (OUTPATIENT)
Dept: ELECTROPHYSIOLOGY | Facility: CLINIC | Age: 66
End: 2021-03-08
Payer: MEDICARE

## 2021-03-08 ENCOUNTER — NON-APPOINTMENT (OUTPATIENT)
Age: 66
End: 2021-03-08

## 2021-03-08 VITALS
HEART RATE: 77 BPM | WEIGHT: 198 LBS | DIASTOLIC BLOOD PRESSURE: 80 MMHG | TEMPERATURE: 97.1 F | OXYGEN SATURATION: 98 % | SYSTOLIC BLOOD PRESSURE: 122 MMHG | BODY MASS INDEX: 27.72 KG/M2 | HEIGHT: 71 IN

## 2021-03-08 DIAGNOSIS — I48.19 OTHER PERSISTENT ATRIAL FIBRILLATION: ICD-10-CM

## 2021-03-08 PROCEDURE — 99072 ADDL SUPL MATRL&STAF TM PHE: CPT

## 2021-03-08 PROCEDURE — 93000 ELECTROCARDIOGRAM COMPLETE: CPT

## 2021-03-08 PROCEDURE — 99213 OFFICE O/P EST LOW 20 MIN: CPT

## 2021-03-08 RX ORDER — ASPIRIN ENTERIC COATED TABLETS 81 MG 81 MG/1
81 TABLET, DELAYED RELEASE ORAL DAILY
Qty: 180 | Refills: 0 | Status: DISCONTINUED | COMMUNITY
Start: 2018-04-19 | End: 2021-03-08

## 2021-03-21 PROBLEM — I48.19 PERSISTENT ATRIAL FIBRILLATION: Status: ACTIVE | Noted: 2021-03-21

## 2021-03-21 NOTE — PHYSICAL EXAM
[General Appearance - In No Acute Distress] : no acute distress [Normal Conjunctiva] : the conjunctiva exhibited no abnormalities [Normal Jugular Venous V Waves Present] : normal jugular venous V waves present [Auscultation Breath Sounds / Voice Sounds] : lungs were clear to auscultation bilaterally [Arterial Pulses Normal] : the arterial pulses were normal [Edema] : no peripheral edema present [FreeTextEntry1] : Irregularly irregular [Abdomen Tenderness] : non-tender [Nail Clubbing] : no clubbing of the fingernails [Cyanosis, Localized] : no localized cyanosis [Impaired Insight] : insight and judgment were intact

## 2021-03-21 NOTE — HISTORY OF PRESENT ILLNESS
[FreeTextEntry1] : Patient is a 65-year-old man who is seen in follow-up evaluation for recurrent atrial fibrillation.\par \par He had recurrent atrial fibrillation when seen in the urgent care center on 2/9/2021.  He was started on Multitak on 2/10/2021 but did not continue because of high co-pay.  Patient was then started on sotalol 80 mg twice daily on 2/11/2021.  He has been on Eliquis 5 mg twice daily.\par \par He has had symptoms of shortness of breath and fatigue.  He denies chest pain, dizziness, lightheadedness, syncope, presyncope or palpitations.\par \par \par He has a prior history of hypertension for which he is on amlodipine.  There is no prior history of diabetes, stroke, TIA, thyroid disorder, lung disease, renal dysfunction or GI disturbances.\par \par \par Took sotalol for 2 wks then stopped. \par doesn’t want pills. \par for CV 3/17 if recurrent AF he would consider Ablation.

## 2021-03-21 NOTE — REVIEW OF SYSTEMS
[Fever] : no fever [Feeling Fatigued] : feeling fatigued [Sore Throat] : no sore throat [Shortness Of Breath] : shortness of breath [Cough] : no cough [Abdominal Pain] : no abdominal pain [Dizziness] : no dizziness [Easy Bleeding] : no tendency for easy bleeding [Easy Bruising] : no tendency for easy bruising

## 2021-03-21 NOTE — DISCUSSION/SUMMARY
[FreeTextEntry1] : Patient has recurrent symptomatic atrial fibrillation and cardioversion procedure is planned.\par \par His last echocardiogram from 2/22/2021 showed EF 54% and left atrial diameter 4.8 cm.  He has moderate mitral regurgitation.  The left atrial volume index was 41 cc/m².\par \par We discussed treatment options of recurrent atrial fibrillation after cardioversion.  Patient most likely need catheter ablation procedure.  The procedure including the risk, benefits, success rate, recurrence rate, potential complications were all discussed.  He will think about this option and see me in follow-up after the cardioversion procedure.\par \par

## 2021-03-24 ENCOUNTER — APPOINTMENT (OUTPATIENT)
Dept: CARDIOLOGY | Facility: CLINIC | Age: 66
End: 2021-03-24

## 2021-03-25 ENCOUNTER — APPOINTMENT (OUTPATIENT)
Dept: CARDIOLOGY | Facility: CLINIC | Age: 66
End: 2021-03-25
Payer: MEDICARE

## 2021-03-25 VITALS
BODY MASS INDEX: 28 KG/M2 | TEMPERATURE: 96.8 F | HEART RATE: 73 BPM | HEIGHT: 71 IN | DIASTOLIC BLOOD PRESSURE: 70 MMHG | WEIGHT: 200 LBS | SYSTOLIC BLOOD PRESSURE: 124 MMHG | OXYGEN SATURATION: 96 %

## 2021-03-25 PROCEDURE — 99072 ADDL SUPL MATRL&STAF TM PHE: CPT

## 2021-03-25 PROCEDURE — 99214 OFFICE O/P EST MOD 30 MIN: CPT

## 2021-03-25 RX ORDER — SOTALOL HYDROCHLORIDE 80 MG/1
80 TABLET ORAL TWICE DAILY
Qty: 180 | Refills: 0 | Status: DISCONTINUED | COMMUNITY
Start: 2021-02-11 | End: 2021-03-25

## 2021-03-25 NOTE — REVIEW OF SYSTEMS
[Feeling Fatigued] : feeling fatigued [Shortness Of Breath] : shortness of breath [Fever] : no fever [Sore Throat] : no sore throat [Cough] : no cough [Abdominal Pain] : no abdominal pain [Dizziness] : no dizziness [Easy Bleeding] : no tendency for easy bleeding [Easy Bruising] : no tendency for easy bruising

## 2021-03-25 NOTE — PHYSICAL EXAM
[General Appearance - In No Acute Distress] : no acute distress [Normal Conjunctiva] : the conjunctiva exhibited no abnormalities [Normal Jugular Venous V Waves Present] : normal jugular venous V waves present [Auscultation Breath Sounds / Voice Sounds] : lungs were clear to auscultation bilaterally [Arterial Pulses Normal] : the arterial pulses were normal [Edema] : no peripheral edema present [Abdomen Tenderness] : non-tender [Nail Clubbing] : no clubbing of the fingernails [Cyanosis, Localized] : no localized cyanosis [Impaired Insight] : insight and judgment were intact [FreeTextEntry1] : Irregularly irregular

## 2021-03-25 NOTE — REASON FOR VISIT
[Follow-Up - Clinic] : a clinic follow-up of [Atrial Fibrillation] : atrial fibrillation [FreeTextEntry2] : Follow up

## 2021-03-25 NOTE — HISTORY OF PRESENT ILLNESS
[FreeTextEntry1] : Patient is a 65-year-old man who is seen in follow-up evaluation for recurrent atrial fibrillation.\par \par He had recurrent atrial fibrillation when seen in the urgent care center on 2/9/2021.  He was started on Multitak on 2/10/2021 but did not continue because of high co-pay.  Patient was then started on sotalol 80 mg twice daily on 2/11/2021.  He has been on Eliquis 5 mg twice daily.\par \par He has had symptoms of shortness of breath and fatigue.  He denies chest pain, dizziness, lightheadedness, syncope, presyncope or palpitations.\par \par \par He has a prior history of hypertension for which he is on amlodipine.  There is no prior history of diabetes, stroke, TIA, thyroid disorder, lung disease, renal dysfunction or GI disturbances.\par \par \par

## 2021-03-25 NOTE — LETTER BODY
[Patient meets requirements of [facility] COVID-19 vaccine eligibility phase 1b.] : [unfilled], under my care, meets the requirements of [place] COVID-19 vaccine eligibility phase 1b based on a qualifying medical condition.

## 2021-03-25 NOTE — DISCUSSION/SUMMARY
[FreeTextEntry1] : Patient has recurrent symptomatic atrial fibrillation status post AWAIS cardioversion last week.  Remains in normal sinus rhythm.  Asymptomatic.\par \par His last echocardiogram from 2/22/2021 showed EF 54% and left atrial diameter 4.8 cm.  He has moderate mitral regurgitation.  The left atrial volume index was 41 cc/m².\par \par Continue Eliquis.  Follow-up with electrophysiology.  Possible ablation will be planned.  Patient will follow-up with electrophysiology.  Cardiology follow-up 6 months and as needed.

## 2021-04-12 ENCOUNTER — APPOINTMENT (OUTPATIENT)
Dept: CARDIOLOGY | Facility: CLINIC | Age: 66
End: 2021-04-12

## 2021-08-25 ENCOUNTER — RX RENEWAL (OUTPATIENT)
Age: 66
End: 2021-08-25

## 2021-09-27 ENCOUNTER — APPOINTMENT (OUTPATIENT)
Dept: CARDIOLOGY | Facility: CLINIC | Age: 66
End: 2021-09-27

## 2021-10-12 ENCOUNTER — APPOINTMENT (OUTPATIENT)
Dept: CARDIOLOGY | Facility: CLINIC | Age: 66
End: 2021-10-12
Payer: MEDICARE

## 2021-10-12 VITALS
BODY MASS INDEX: 26.92 KG/M2 | DIASTOLIC BLOOD PRESSURE: 88 MMHG | TEMPERATURE: 98.4 F | OXYGEN SATURATION: 95 % | SYSTOLIC BLOOD PRESSURE: 134 MMHG | HEIGHT: 70 IN | WEIGHT: 188 LBS | HEART RATE: 77 BPM

## 2021-10-12 DIAGNOSIS — R94.31 ABNORMAL ELECTROCARDIOGRAM [ECG] [EKG]: ICD-10-CM

## 2021-10-12 PROCEDURE — 99214 OFFICE O/P EST MOD 30 MIN: CPT

## 2021-10-12 RX ORDER — APIXABAN 5 MG/1
5 TABLET, FILM COATED ORAL
Qty: 180 | Refills: 2 | Status: DISCONTINUED | COMMUNITY
End: 2021-10-12

## 2021-10-12 NOTE — LETTER BODY
[Patient meets requirements of [facility] COVID-19 vaccine eligibility phase 1b.] : [unfilled], under my care, meets the requirements of [place] COVID-19 vaccine eligibility phase 1b based on a qualifying medical condition.

## 2021-10-12 NOTE — DISCUSSION/SUMMARY
[FreeTextEntry1] : Patient has recurrent symptomatic atrial fibrillation status post AWAIS cardioversion .  Remains in normal sinus rhythm.  Asymptomatic.\par \par His last echocardiogram from 2/22/2021 showed EF 54% and left atrial diameter 4.8 cm.  He has moderate mitral regurgitation.  The left atrial volume index was 41 cc/\par He stopped taking Eliquis on his own.  He is taking full dose of aspirin.  He remains in normal sinus rhythm.  He understands the risk and benefit of anticoagulation.\par   Follow-up with electrophysiology.  Possible ablation will be planned.  Patient will follow-up with electrophysiology.  Cardiology follow-up 6 months and as needed.

## 2021-10-12 NOTE — HISTORY OF PRESENT ILLNESS
[FreeTextEntry1] : Patient is a 66-year-old man who is seen in follow-up evaluation for recurrent atrial fibrillation.\par \par He had recurrent atrial fibrillation when seen in the urgent care center on 2/9/2021.  He was started on Multitak on 2/10/2021 but did not continue because of high co-pay.  Patient was then started on sotalol 80 mg twice daily on 2/11/2021.  He has been on Eliquis 5 mg twice daily.\par \par He has had symptoms of shortness of breath and fatigue.  He denies chest pain, dizziness, lightheadedness, syncope, presyncope or palpitations.\par \par \par He has a prior history of hypertension for which he is on amlodipine.  There is no prior history of diabetes, stroke, TIA, thyroid disorder, lung disease, renal dysfunction or GI disturbances.\par \par \par

## 2021-10-12 NOTE — PHYSICAL EXAM
[Well Developed] : well developed [Well Nourished] : well nourished [No Acute Distress] : no acute distress [Normal Venous Pressure] : normal venous pressure [No Carotid Bruit] : no carotid bruit [Normal S1, S2] : normal S1, S2 [No Murmur] : no murmur [No Rub] : no rub [No Gallop] : no gallop [Clear Lung Fields] : clear lung fields [Good Air Entry] : good air entry [No Respiratory Distress] : no respiratory distress  [Soft] : abdomen soft [Non Tender] : non-tender [No Masses/organomegaly] : no masses/organomegaly [Normal Bowel Sounds] : normal bowel sounds [Normal Gait] : normal gait [No Edema] : no edema [No Cyanosis] : no cyanosis [No Clubbing] : no clubbing [No Varicosities] : no varicosities [No Rash] : no rash [No Skin Lesions] : no skin lesions [Moves all extremities] : moves all extremities [No Focal Deficits] : no focal deficits [Normal Speech] : normal speech [Alert and Oriented] : alert and oriented [Normal memory] : normal memory [General Appearance - In No Acute Distress] : no acute distress [Normal Conjunctiva] : the conjunctiva exhibited no abnormalities [Normal Jugular Venous V Waves Present] : normal jugular venous V waves present [Auscultation Breath Sounds / Voice Sounds] : lungs were clear to auscultation bilaterally [Arterial Pulses Normal] : the arterial pulses were normal [Edema] : no peripheral edema present [Abdomen Tenderness] : non-tender [Nail Clubbing] : no clubbing of the fingernails [Cyanosis, Localized] : no localized cyanosis [Impaired Insight] : insight and judgment were intact [FreeTextEntry1] : Irregularly irregular

## 2021-10-27 NOTE — H&P PST ADULT - PMH
[Well Developed] : well developed [Well Nourished] : well nourished [No Acute Distress] : no acute distress [Normal Conjunctiva] : normal conjunctiva [Normal Venous Pressure] : normal venous pressure [No Carotid Bruit] : no carotid bruit Atrial fibrillation    Hypertension [Normal S1, S2] : normal S1, S2 [No Murmur] : no murmur [No Rub] : no rub [No Gallop] : no gallop [Clear Lung Fields] : clear lung fields [Good Air Entry] : good air entry [No Respiratory Distress] : no respiratory distress  [Soft] : abdomen soft [Non Tender] : non-tender [No Masses/organomegaly] : no masses/organomegaly [Normal Bowel Sounds] : normal bowel sounds [Normal Gait] : normal gait [No Edema] : no edema [No Cyanosis] : no cyanosis [No Clubbing] : no clubbing [No Varicosities] : no varicosities [No Rash] : no rash [No Skin Lesions] : no skin lesions [Moves all extremities] : moves all extremities [No Focal Deficits] : no focal deficits [Normal Speech] : normal speech [Alert and Oriented] : alert and oriented [Normal memory] : normal memory

## 2022-01-17 NOTE — DISCHARGE NOTE ADULT - NS AS ACTIVITY OBS
16
Do not drive or operate machinery/Return to Work/School allowed/Stairs allowed/Showering allowed/Walking-Indoors allowed/Sex allowed/Walking-Outdoors allowed/Do not make important decisions/Bathing allowed

## 2022-04-21 ENCOUNTER — APPOINTMENT (OUTPATIENT)
Dept: CARDIOLOGY | Facility: CLINIC | Age: 67
End: 2022-04-21

## 2022-11-03 NOTE — PATIENT PROFILE ADULT. - FUNCTIONAL SCREEN CURRENT LEVEL: TOILETING, MLM
(0) independent Protocol For Photochemotherapy For Severe Photoresponsive Dermatoses: Tar And Nbuvb (Goeckerman Treatment): The patient received Photochemotherapy for severe photoresponsive dermatoses: Tar and NBUVB (Goeckerman treatment) requiring at least 4 to 8 hours of care under direct physician supervision.

## 2023-11-22 NOTE — H&P PST ADULT - DOES THIS PATIENT HAVE A HISTORY OF OR HAS BEEN DX WITH HEART FAILURE?
Patient states here for blood pressure check. States at home blood pressure readings SBP: 130s-140s. DBP: 90s. Saw PCP 11/18, who stopped lisinopril. Blood pressure reading here 129/75. Patient denies visual changes denies headache but states mild pressure. Pt verbalized stressing out about elevated BP readings. Patient stated may have been using BP machine incorrectly. Instructed pt proper blood pressure measurement technique.  Patient verbalized understanding. Patient states will call PCP tomorrow morning and discuss blood medication management. Informed pt can be evaluated at [facility] by provider, but pt states will call tomorrow morning since will need to f.u regardless and because of normal blood pressure reading. Instructed pt to return for any elevated readings. Patient verbalized understanding.   
yes

## 2024-04-04 ENCOUNTER — APPOINTMENT (OUTPATIENT)
Dept: CARDIOLOGY | Facility: CLINIC | Age: 69
End: 2024-04-04
Payer: MEDICARE

## 2024-04-04 VITALS
SYSTOLIC BLOOD PRESSURE: 150 MMHG | BODY MASS INDEX: 28.2 KG/M2 | OXYGEN SATURATION: 94 % | HEIGHT: 70 IN | HEART RATE: 77 BPM | DIASTOLIC BLOOD PRESSURE: 92 MMHG | WEIGHT: 197 LBS

## 2024-04-04 DIAGNOSIS — I10 ESSENTIAL (PRIMARY) HYPERTENSION: ICD-10-CM

## 2024-04-04 PROCEDURE — 99214 OFFICE O/P EST MOD 30 MIN: CPT

## 2024-04-04 PROCEDURE — G2211 COMPLEX E/M VISIT ADD ON: CPT

## 2024-04-04 PROCEDURE — 93000 ELECTROCARDIOGRAM COMPLETE: CPT

## 2024-04-04 RX ORDER — ASPIRIN 325 MG/1
325 TABLET, FILM COATED ORAL DAILY
Refills: 0 | Status: DISCONTINUED | COMMUNITY
Start: 2021-03-08 | End: 2024-04-04

## 2024-04-04 NOTE — DISCUSSION/SUMMARY
[FreeTextEntry1] : Because of his symptoms and risk factors for coronary artery disease such as untreated hyperlipidemia I have scheduled a coronary CTA. Depending on findings will determine the aggressiveness of his lipid therapy.. EKG today was normal sinus rhythm [EKG obtained to assist in diagnosis and management of assessed problem(s)] : EKG obtained to assist in diagnosis and management of assessed problem(s)

## 2024-04-04 NOTE — ASSESSMENT
[FreeTextEntry1] : ECG- AFIB 81bpm no significant ST or T wave changes. \par  \par  Echo 2/22/21\par  EF 60-65%\par  Moderate-Sev mitral regurgitation \par  Moderate -Sev. tricuspid regurgitation \par  \par  Lab  data \par       10/12/20  4/11/18\par  Chol. 218        199\par  HDL   46           40\par  LDL  155        127\par  Tri. 145\par  \par  HGB 15.2\par  Creat.0.61\par  \par  Exercise echocardiogram 5/24/18:\par  Patient exercised for 7 minutes 35 seconds (8 Mets). \par  No significant symptoms.\par  Peak heart rate 164 (121%).\par  Blood pressure response hypertensive\par  ECG shows no ischemic changes but frequent APCs.\par  Baseline echocardiogram normal with appropriate augmentation with exercise. The test is considered negative for exercise-induced ischemia or exercise-induced atrial fibrillation\par  \par  Impression\par  1.  3/ 2017 cryoablation for atrial fibrillation By his report,  last episode was a year ago in 2020 and responded to meds given at [facility].  Last data obtained from his IRL  shows no evidence of recurrent atrial fibrillation .Has not followed through with explantation\par  \par  -Now in persistent AF since 2/9/21 with good rate control and tolerating Sotalol 80 mg BID. QTC is WNL \par  -Eliquis 5 mg BID\par  - SOB w/o exertion since going back into afib.  \par  \par  2. .HX of  intrascapular discomfort has some typical and atypical features which has not reoccurred.\par     Stress test showed no evidence of exercise-induced ischemia and discomfort has resolved\par  \par  3.BP today fine. \par  \par  4. Echo with NL LV fxn but new mod-sev. TR and MR which is possibly related to his AFIB.\par  \par  5. Amlodipine 10 mg QD and no current edema. \par  \par  6. No anginal exertion. \par  \par  \par  \par  Plan:\par  1. Although ventricular rate is controlled due to valvular disease and symptom Mr. Llanos would benefit from a AWAIS/cardioversion, he agrees. This will be arranged through our office. \par  Reviewed with him the risk and benefit of the procedure including the potential for it to be unsuccessful.\par  The AWAIS would also allow us to look more carefully at the valvular disease which is a new finding.\par  \par  2. Strong encouraged him to watch diet and exercise.  May need to consider statin therapy if lipids don't come    down with diet alone.  \par  \par  3. Continue Sotalol and AC therapy. \par  \par  4. Repeat BW due soon. \par  \par  \par  Clinical follow up  after procedure is done.

## 2024-04-04 NOTE — REASON FOR VISIT
[FreeTextEntry1] : I saw this 67 y/o male in f/u on 04/04/24 He presents today because of the symptoms of exertional dyspnea and coughing and vague chest discomfort. He has a history of recurrent A-fib with an ablation and has remained in sinus rhythm on a beta-blocker since.   He was seen on 2/10/21  rx'd Multaq 400 mg but had a high copay.  Sotalol 80 mg BID is  the preferred and this was started on 2/11/2021.   AC therapy is eliquis 5 mg BID.   Since converting back into AFIB he has felt SOB but no chest pain or palps.   Repeat ECG to reassess QTC  and results of his echo will be reviewed  Avoiding  ETOH, caffeine no personal stressors.    Last episode was in 2020 where he was treated at [facility]. Denies cardioversion believes he was treated pharmacologically.         [Follow-Up - Clinic] : a clinic follow-up of [Atrial Fibrillation] : atrial fibrillation

## 2024-04-04 NOTE — PHYSICAL EXAM
[General Appearance - Well Developed] : well developed [Normal Appearance] : normal appearance [Well Groomed] : well groomed [General Appearance - Well Nourished] : well nourished [No Deformities] : no deformities [General Appearance - In No Acute Distress] : no acute distress [FreeTextEntry1] :                    Well appearing and nourished with no obvious deformities or distress.\par  \par  Eyes: \par  No conjunctival injection and no xanthelasmas.\par  HEENT: \par  Normocephalic.Normal oral mucosa. No pallor or cyanosis\par  Neck: \par  No jugular venous distension. with normal A and V wave forms. No palpable adenopathy.\par  Cardiovascular: \par  Normal rate with irregular S1, S2 and a grade 1/6 systolic murmur. Distal arterial pulses are normal. No significant peripheral edema.\par  Pulmonary: \par  Lungs are clear to auscultation and percussion. Normal respiratory pattern without any accessory muscle use\par  Abdomen: \par  Soft, non-tender ; no palpable organomegaly or masses.\par  Extremities:\par  No digital clubbing, cyanosis or ischemic changes.\par  Skin: \par  No skin lesions, rashes, ulcers or xanthomas.\par  Psychiatric: \par  Alert and oriented to person, place and time. Appropriate mood and affect. [Normal Conjunctiva] : the conjunctiva exhibited no abnormalities [Eyelids - No Xanthelasma] : the eyelids demonstrated no xanthelasmas [Normal Oral Mucosa] : normal oral mucosa [No Oral Pallor] : no oral pallor [No Oral Cyanosis] : no oral cyanosis [Normal Jugular Venous A Waves Present] : normal jugular venous A waves present [Normal Jugular Venous V Waves Present] : normal jugular venous V waves present [No Jugular Venous Lee A Waves] : no jugular venous lee A waves [Respiration, Rhythm And Depth] : normal respiratory rhythm and effort [Exaggerated Use Of Accessory Muscles For Inspiration] : no accessory muscle use [Auscultation Breath Sounds / Voice Sounds] : lungs were clear to auscultation bilaterally [Heart Sounds] : normal S1 and S2 [Murmurs] : no murmurs present [Irregularly Irregular] : the rhythm was irregularly irregular [Abdomen Soft] : soft [Abdomen Tenderness] : non-tender [Abdomen Mass (___ Cm)] : no abdominal mass palpated [Abnormal Walk] : normal gait [Gait - Sufficient For Exercise Testing] : the gait was sufficient for exercise testing [Nail Clubbing] : no clubbing of the fingernails [Cyanosis, Localized] : no localized cyanosis [Petechial Hemorrhages (___cm)] : no petechial hemorrhages [Skin Color & Pigmentation] : normal skin color and pigmentation [] : no rash [No Venous Stasis] : no venous stasis [Skin Lesions] : no skin lesions [No Skin Ulcers] : no skin ulcer [No Xanthoma] : no  xanthoma was observed [Oriented To Time, Place, And Person] : oriented to person, place, and time [Affect] : the affect was normal [Mood] : the mood was normal [No Anxiety] : not feeling anxious

## 2024-04-04 NOTE — HISTORY OF PRESENT ILLNESS
[FreeTextEntry1] : he has vague chest pain He has  shortness of breath He has no palpitations He has no syncope He is neurologically intact He has no edema He has no GI symptoms

## 2024-05-07 ENCOUNTER — RESULT REVIEW (OUTPATIENT)
Age: 69
End: 2024-05-07

## 2024-05-20 RX ORDER — AMLODIPINE BESYLATE 10 MG/1
10 TABLET ORAL
Qty: 90 | Refills: 1 | Status: DISCONTINUED | COMMUNITY
Start: 2018-01-24 | End: 2024-05-20

## 2024-05-21 ENCOUNTER — APPOINTMENT (OUTPATIENT)
Dept: CARDIOLOGY | Facility: CLINIC | Age: 69
End: 2024-05-21
Payer: MEDICARE

## 2024-05-21 VITALS
SYSTOLIC BLOOD PRESSURE: 164 MMHG | HEART RATE: 62 BPM | BODY MASS INDEX: 27.77 KG/M2 | HEIGHT: 70 IN | WEIGHT: 194 LBS | OXYGEN SATURATION: 96 % | DIASTOLIC BLOOD PRESSURE: 72 MMHG

## 2024-05-21 DIAGNOSIS — Z98.890 OTHER SPECIFIED POSTPROCEDURAL STATES: ICD-10-CM

## 2024-05-21 DIAGNOSIS — I10 ESSENTIAL (PRIMARY) HYPERTENSION: ICD-10-CM

## 2024-05-21 DIAGNOSIS — Z79.01 LONG TERM (CURRENT) USE OF ANTICOAGULANTS: ICD-10-CM

## 2024-05-21 DIAGNOSIS — R07.9 CHEST PAIN, UNSPECIFIED: ICD-10-CM

## 2024-05-21 DIAGNOSIS — Z86.79 OTHER SPECIFIED POSTPROCEDURAL STATES: ICD-10-CM

## 2024-05-21 DIAGNOSIS — Z95.818 PRESENCE OF OTHER CARDIAC IMPLANTS AND GRAFTS: ICD-10-CM

## 2024-05-21 DIAGNOSIS — I48.0 PAROXYSMAL ATRIAL FIBRILLATION: ICD-10-CM

## 2024-05-21 PROCEDURE — G2211 COMPLEX E/M VISIT ADD ON: CPT

## 2024-05-21 PROCEDURE — 99214 OFFICE O/P EST MOD 30 MIN: CPT

## 2024-05-21 RX ORDER — ROSUVASTATIN CALCIUM 10 MG/1
10 TABLET, FILM COATED ORAL DAILY
Refills: 0 | Status: ACTIVE | COMMUNITY

## 2024-05-21 RX ORDER — OLMESARTAN MEDOXOMIL 20 MG/1
20 TABLET, FILM COATED ORAL DAILY
Refills: 0 | Status: DISCONTINUED | COMMUNITY
End: 2024-05-21

## 2024-05-21 NOTE — PHYSICAL EXAM
[General Appearance - Well Developed] : well developed [Normal Appearance] : normal appearance [Well Groomed] : well groomed [General Appearance - Well Nourished] : well nourished [No Deformities] : no deformities [General Appearance - In No Acute Distress] : no acute distress [Normal Conjunctiva] : the conjunctiva exhibited no abnormalities [Eyelids - No Xanthelasma] : the eyelids demonstrated no xanthelasmas [Normal Oral Mucosa] : normal oral mucosa [No Oral Pallor] : no oral pallor [No Oral Cyanosis] : no oral cyanosis [Normal Jugular Venous A Waves Present] : normal jugular venous A waves present [Normal Jugular Venous V Waves Present] : normal jugular venous V waves present [No Jugular Venous Lee A Waves] : no jugular venous lee A waves [Respiration, Rhythm And Depth] : normal respiratory rhythm and effort [Exaggerated Use Of Accessory Muscles For Inspiration] : no accessory muscle use [Auscultation Breath Sounds / Voice Sounds] : lungs were clear to auscultation bilaterally [Murmurs] : no murmurs present [Heart Sounds] : normal S1 and S2 [Irregularly Irregular] : the rhythm was irregularly irregular [Abdomen Soft] : soft [Abdomen Tenderness] : non-tender [Abdomen Mass (___ Cm)] : no abdominal mass palpated [Abnormal Walk] : normal gait [Gait - Sufficient For Exercise Testing] : the gait was sufficient for exercise testing [Cyanosis, Localized] : no localized cyanosis [Nail Clubbing] : no clubbing of the fingernails [Petechial Hemorrhages (___cm)] : no petechial hemorrhages [Skin Color & Pigmentation] : normal skin color and pigmentation [] : no rash [No Venous Stasis] : no venous stasis [Skin Lesions] : no skin lesions [No Skin Ulcers] : no skin ulcer [No Xanthoma] : no  xanthoma was observed [Oriented To Time, Place, And Person] : oriented to person, place, and time [Affect] : the affect was normal [Mood] : the mood was normal [No Anxiety] : not feeling anxious [FreeTextEntry1] :                    Well appearing and nourished with no obvious deformities or distress.\par  \par  Eyes: \par  No conjunctival injection and no xanthelasmas.\par  HEENT: \par  Normocephalic.Normal oral mucosa. No pallor or cyanosis\par  Neck: \par  No jugular venous distension. with normal A and V wave forms. No palpable adenopathy.\par  Cardiovascular: \par  Normal rate with irregular S1, S2 and a grade 1/6 systolic murmur. Distal arterial pulses are normal. No significant peripheral edema.\par  Pulmonary: \par  Lungs are clear to auscultation and percussion. Normal respiratory pattern without any accessory muscle use\par  Abdomen: \par  Soft, non-tender ; no palpable organomegaly or masses.\par  Extremities:\par  No digital clubbing, cyanosis or ischemic changes.\par  Skin: \par  No skin lesions, rashes, ulcers or xanthomas.\par  Psychiatric: \par  Alert and oriented to person, place and time. Appropriate mood and affect.

## 2024-05-21 NOTE — REASON FOR VISIT
[Follow-Up - Clinic] : a clinic follow-up of [Atrial Fibrillation] : atrial fibrillation [FreeTextEntry1] : I saw this 69 y/o male in f/u on 05/21/24 He presents today because of the symptoms of exertional dyspnea and coughing and vague chest discomfort. He has a history of recurrent A-fib with an ablation and has remained in sinus rhythm on a beta-blocker since.   He was seen on 2/10/21  rx'd Multaq 400 mg but had a high copay.  Sotalol 80 mg BID is  the preferred and this was started on 2/11/2021.   AC therapy is eliquis 5 mg BID.   Since converting back into AFIB he has felt SOB but no chest pain or palps.   Repeat ECG to reassess QTC  and results of his echo will be reviewed  Avoiding  ETOH, caffeine no personal stressors.    Last episode was in 2020 where he was treated at [facility]. Denies cardioversion believes he was treated pharmacologically.  He had a coronary CTA which showed moderate calcium and moderate plaque but of concern a 50% narrowing of the distal left main. I will schedule a nuclear stress test.

## 2024-05-21 NOTE — DISCUSSION/SUMMARY
[FreeTextEntry1] : Because of his symptoms and risk factors for coronary artery disease such as untreated hyperlipidemia I have scheduled a coronary CTA. Depending on findings will determine the aggressiveness of his lipid therapy.. The CT showed moderate plaque and calcium, but of concern a 50% distal left main narrowing. Will schedule a nuclear stress test. Because of uncontrolled hypertension I stopped the olmesartan and put him on losartan 50 daily which he will increase to 100 if necessary.  If 100 his blood pressure is still not under control he will discuss it with me and change medication.

## 2024-05-21 NOTE — ASSESSMENT
[FreeTextEntry1] : ECG- AFIB 81bpm no significant ST or T wave changes. \par  \par  Echo 2/22/21\par  EF 60-65%\par  Moderate-Sev mitral regurgitation \par  Moderate -Sev. tricuspid regurgitation \par  \par  Lab  data \par       10/12/20  4/11/18\par  Chol. 218        199\par  HDL   46           40\par  LDL  155        127\par  Tri. 145\par  \par  HGB 15.2\par  Creat.0.61\par  \par  Exercise echocardiogram 5/24/18:\par  Patient exercised for 7 minutes 35 seconds (8 Mets). \par  No significant symptoms.\par  Peak heart rate 164 (121%).\par  Blood pressure response hypertensive\par  ECG shows no ischemic changes but frequent APCs.\par  Baseline echocardiogram normal with appropriate augmentation with exercise. The test is considered negative for exercise-induced ischemia or exercise-induced atrial fibrillation\par  \par  Impression\par  1.  3/ 2017 cryoablation for atrial fibrillation By his report,  last episode was a year ago in 2020 and responded to meds given at [facility].  Last data obtained from his IRL  shows no evidence of recurrent atrial fibrillation .Has not followed through with explantation\par  \par  -Now in persistent AF since 2/9/21 with good rate control and tolerating Sotalol 80 mg BID. QTC is WNL \par  -Eliquis 5 mg BID\par  - SOB w/o exertion since going back into afib.  \par  \par  2. .HX of  intrascapular discomfort has some typical and atypical features which has not reoccurred.\par     Stress test showed no evidence of exercise-induced ischemia and discomfort has resolved\par  \par  3.BP today fine. \par  \par  4. Echo with NL LV fxn but new mod-sev. TR and MR which is possibly related to his AFIB.\par  \par  5. Amlodipine 10 mg QD and no current edema. \par  \par  6. No anginal exertion. \par  \par  \par  \par  Plan:\par  1. Although ventricular rate is controlled due to valvular disease and symptom Mr. Llanos would benefit from a AWAIS/cardioversion, he agrees. This will be arranged through our office. \par  Reviewed with him the risk and benefit of the procedure including the potential for it to be unsuccessful.\par  The AWAIS would also allow us to look more carefully at the valvular disease which is a new finding.\par  \par  2. Strong encouraged him to watch diet and exercise.  May need to consider statin therapy if lipids don't come    down with diet alone.  \par  \par  3. Continue Sotalol and AC therapy. \par  \par  4. Repeat BW due soon. \par  \par  \par  Clinical follow up  after procedure is done.

## 2024-05-31 RX ORDER — CHLORTHALIDONE 25 MG/1
25 TABLET ORAL
Qty: 90 | Refills: 1 | Status: ACTIVE | COMMUNITY
Start: 2024-05-31 | End: 1900-01-01

## 2024-05-31 RX ORDER — LOSARTAN POTASSIUM 100 MG/1
100 TABLET, FILM COATED ORAL DAILY
Qty: 90 | Refills: 1 | Status: ACTIVE | COMMUNITY
Start: 2024-05-21

## 2024-05-31 RX ORDER — METOPROLOL SUCCINATE 100 MG/1
100 TABLET, EXTENDED RELEASE ORAL DAILY
Refills: 0 | Status: ACTIVE | COMMUNITY
Start: 2017-12-22

## 2024-06-04 ENCOUNTER — APPOINTMENT (OUTPATIENT)
Dept: CARDIOLOGY | Facility: CLINIC | Age: 69
End: 2024-06-04
Payer: MEDICARE

## 2024-06-04 PROCEDURE — 78452 HT MUSCLE IMAGE SPECT MULT: CPT

## 2024-06-04 PROCEDURE — 93015 CV STRESS TEST SUPVJ I&R: CPT

## 2024-06-04 PROCEDURE — A9502: CPT

## 2024-08-06 ENCOUNTER — APPOINTMENT (OUTPATIENT)
Dept: CARDIOLOGY | Facility: CLINIC | Age: 69
End: 2024-08-06

## 2024-10-08 NOTE — DISCHARGE NOTE ADULT - ADDITIONAL INSTRUCTIONS
07-Oct-2024 19:50 Follow up with Dr. Ruiz in 2-4 weeks. Please call to schedule appointment. Follow up with Dr. Ruiz in 2-4 weeks. Please call to schedule appointment.  Follow up with Dr Palomino in 2-4 weeks, please call to schedule

## 2025-02-21 NOTE — H&P PST ADULT - PSYCHIATRIC
Refill per VO of Dr. Almanza  Last appt: 2/7/2024    Future Appointments   Date Time Provider Department Center   5/20/2025  1:40 PM Dong Almanza MD [facility]       Requested Prescriptions     Signed Prescriptions Disp Refills    carvedilol (COREG) 25 MG tablet 270 tablet 1     Sig: Take 1.5 tablets by mouth 2 times daily     Authorizing Provider: DONG ALMANZA     Ordering User: MARIA GUADALUPE GUIDO      details… detailed exam

## 2025-05-01 NOTE — ASSESSMENT
[FreeTextEntry1] : ECG- AFIB 81bpm no significant ST or T wave changes. \par \par Echo 2/22/21\par EF 60-65%\par Moderate-Sev mitral regurgitation \par Moderate -Sev. tricuspid regurgitation \par \par Lab  data \par      10/12/20  4/11/18\par Chol. 218        199\par HDL   46           40\par LDL  155        127\par Tri. 145\par \par HGB 15.2\par Creat.0.61\par \par Exercise echocardiogram 5/24/18:\par Patient exercised for 7 minutes 35 seconds (8 Mets). \par No significant symptoms.\par Peak heart rate 164 (121%).\par Blood pressure response hypertensive\par ECG shows no ischemic changes but frequent APCs.\par Baseline echocardiogram normal with appropriate augmentation with exercise. The test is considered negative for exercise-induced ischemia or exercise-induced atrial fibrillation\par \par Impression\par 1.  3/ 2017 cryoablation for atrial fibrillation By his report,  last episode was a year ago in 2020 and responded to meds given at [facility].  Last data obtained from his IRL  shows no evidence of recurrent atrial fibrillation .Has not followed through with explantation\par \par -Now in persistent AF since 2/9/21 with good rate control and tolerating Sotalol 80 mg BID. QTC is WNL \par -Eliquis 5 mg BID\par - SOB w/o exertion since going back into afib.  \par \par 2. .HX of  intrascapular discomfort has some typical and atypical features which has not reoccurred.\par    Stress test showed no evidence of exercise-induced ischemia and discomfort has resolved\par \par 3.BP today fine. \par \par 4. Echo with NL LV fxn but new mod-sev. TR and MR which is possibly related to his AFIB.\par \par 5. Amlodipine 10 mg QD and no current edema. \par \par 6. No anginal exertion. \par \par \par \par Plan:\par 1. Although ventricular rate is controlled due to valvular disease and symptom Mr. Llanos would benefit from a AWAIS/cardioversion, he agrees. This will be arranged through our office. \par Reviewed with him the risk and benefit of the procedure including the potential for it to be unsuccessful.\par The AWAIS would also allow us to look more carefully at the valvular disease which is a new finding.\par \par 2. Strong encouraged him to watch diet and exercise.  May need to consider statin therapy if lipids don’t come    down with diet alone.  \par \par 3. Continue Sotalol and AC therapy. \par \par 4. Repeat BW due soon. \par \par \par Clinical follow up  after procedure is done.  Detail Level: Zone